# Patient Record
Sex: MALE | Race: WHITE
[De-identification: names, ages, dates, MRNs, and addresses within clinical notes are randomized per-mention and may not be internally consistent; named-entity substitution may affect disease eponyms.]

---

## 2019-05-28 ENCOUNTER — HOSPITAL ENCOUNTER (INPATIENT)
Dept: HOSPITAL 92 - ERS | Age: 64
LOS: 10 days | Discharge: NURSING, REHAB OR LONG-TERM CARE | DRG: 234 | End: 2019-06-07
Attending: HOSPITALIST | Admitting: HOSPITALIST
Payer: MEDICARE

## 2019-05-28 VITALS — BODY MASS INDEX: 30.2 KG/M2

## 2019-05-28 DIAGNOSIS — I25.110: Primary | ICD-10-CM

## 2019-05-28 DIAGNOSIS — E78.00: ICD-10-CM

## 2019-05-28 DIAGNOSIS — Z79.899: ICD-10-CM

## 2019-05-28 DIAGNOSIS — Z88.5: ICD-10-CM

## 2019-05-28 DIAGNOSIS — Z88.8: ICD-10-CM

## 2019-05-28 DIAGNOSIS — Z79.84: ICD-10-CM

## 2019-05-28 DIAGNOSIS — I10: ICD-10-CM

## 2019-05-28 DIAGNOSIS — E11.9: ICD-10-CM

## 2019-05-28 DIAGNOSIS — Z90.89: ICD-10-CM

## 2019-05-28 DIAGNOSIS — Z95.5: ICD-10-CM

## 2019-05-28 LAB
ALBUMIN SERPL BCG-MCNC: 4.8 G/DL (ref 3.4–4.8)
ALP SERPL-CCNC: 83 U/L (ref 40–150)
ALT SERPL W P-5'-P-CCNC: 31 U/L (ref 8–55)
ANION GAP SERPL CALC-SCNC: 14 MMOL/L (ref 10–20)
AST SERPL-CCNC: 23 U/L (ref 5–34)
BASOPHILS # BLD AUTO: 0 THOU/UL (ref 0–0.2)
BASOPHILS NFR BLD AUTO: 0.7 % (ref 0–1)
BILIRUB SERPL-MCNC: 0.6 MG/DL (ref 0.2–1.2)
BUN SERPL-MCNC: 18 MG/DL (ref 8.4–25.7)
CALCIUM SERPL-MCNC: 10.1 MG/DL (ref 7.8–10.44)
CHLORIDE SERPL-SCNC: 106 MMOL/L (ref 98–107)
CK SERPL-CCNC: 163 U/L (ref 30–200)
CO2 SERPL-SCNC: 24 MMOL/L (ref 23–31)
CREAT CL PREDICTED SERPL C-G-VRATE: 0 ML/MIN (ref 70–130)
EOSINOPHIL # BLD AUTO: 0.1 THOU/UL (ref 0–0.7)
EOSINOPHIL NFR BLD AUTO: 2.2 % (ref 0–10)
GLOBULIN SER CALC-MCNC: 2.5 G/DL (ref 2.4–3.5)
GLUCOSE SERPL-MCNC: 151 MG/DL (ref 80–115)
HGB BLD-MCNC: 15.1 G/DL (ref 14–18)
LIPASE SERPL-CCNC: 25 U/L (ref 8–78)
LYMPHOCYTES # BLD: 3 THOU/UL (ref 1.2–3.4)
LYMPHOCYTES NFR BLD AUTO: 44.6 % (ref 21–51)
MCH RBC QN AUTO: 32.3 PG (ref 27–31)
MCV RBC AUTO: 92 FL (ref 78–98)
MONOCYTES # BLD AUTO: 0.5 THOU/UL (ref 0.11–0.59)
MONOCYTES NFR BLD AUTO: 7.1 % (ref 0–10)
NEUTROPHILS # BLD AUTO: 3.1 THOU/UL (ref 1.4–6.5)
NEUTROPHILS NFR BLD AUTO: 45.4 % (ref 42–75)
PLATELET # BLD AUTO: 184 THOU/UL (ref 130–400)
POTASSIUM SERPL-SCNC: 4 MMOL/L (ref 3.5–5.1)
RBC # BLD AUTO: 4.68 MILL/UL (ref 4.7–6.1)
SODIUM SERPL-SCNC: 140 MMOL/L (ref 136–145)
TROPONIN I SERPL DL<=0.01 NG/ML-MCNC: (no result) NG/ML (ref ?–0.03)
WBC # BLD AUTO: 6.8 THOU/UL (ref 4.8–10.8)

## 2019-05-28 PROCEDURE — G0009 ADMIN PNEUMOCOCCAL VACCINE: HCPCS

## 2019-05-28 PROCEDURE — 85025 COMPLETE CBC W/AUTO DIFF WBC: CPT

## 2019-05-28 PROCEDURE — 36415 COLL VENOUS BLD VENIPUNCTURE: CPT

## 2019-05-28 PROCEDURE — S0017 INJECTION, AMINOCAPROIC ACID: HCPCS

## 2019-05-28 PROCEDURE — 80061 LIPID PANEL: CPT

## 2019-05-28 PROCEDURE — 85610 PROTHROMBIN TIME: CPT

## 2019-05-28 PROCEDURE — 85730 THROMBOPLASTIN TIME PARTIAL: CPT

## 2019-05-28 PROCEDURE — 83690 ASSAY OF LIPASE: CPT

## 2019-05-28 PROCEDURE — 93306 TTE W/DOPPLER COMPLETE: CPT

## 2019-05-28 PROCEDURE — 83036 HEMOGLOBIN GLYCOSYLATED A1C: CPT

## 2019-05-28 PROCEDURE — 93005 ELECTROCARDIOGRAM TRACING: CPT

## 2019-05-28 PROCEDURE — 86900 BLOOD TYPING SEROLOGIC ABO: CPT

## 2019-05-28 PROCEDURE — C1769 GUIDE WIRE: HCPCS

## 2019-05-28 PROCEDURE — 36416 COLLJ CAPILLARY BLOOD SPEC: CPT

## 2019-05-28 PROCEDURE — 99152 MOD SED SAME PHYS/QHP 5/>YRS: CPT

## 2019-05-28 PROCEDURE — 93798 PHYS/QHP OP CAR RHAB W/ECG: CPT

## 2019-05-28 PROCEDURE — 84484 ASSAY OF TROPONIN QUANT: CPT

## 2019-05-28 PROCEDURE — 71046 X-RAY EXAM CHEST 2 VIEWS: CPT

## 2019-05-28 PROCEDURE — 82805 BLOOD GASES W/O2 SATURATION: CPT

## 2019-05-28 PROCEDURE — 93458 L HRT ARTERY/VENTRICLE ANGIO: CPT

## 2019-05-28 PROCEDURE — 93010 ELECTROCARDIOGRAM REPORT: CPT

## 2019-05-28 PROCEDURE — 80053 COMPREHEN METABOLIC PANEL: CPT

## 2019-05-28 PROCEDURE — 93017 CV STRESS TEST TRACING ONLY: CPT

## 2019-05-28 PROCEDURE — 90670 PCV13 VACCINE IM: CPT

## 2019-05-28 PROCEDURE — 86850 RBC ANTIBODY SCREEN: CPT

## 2019-05-28 PROCEDURE — 78452 HT MUSCLE IMAGE SPECT MULT: CPT

## 2019-05-28 PROCEDURE — P9045 ALBUMIN (HUMAN), 5%, 250 ML: HCPCS

## 2019-05-28 PROCEDURE — 36430 TRANSFUSION BLD/BLD COMPNT: CPT

## 2019-05-28 PROCEDURE — 93880 EXTRACRANIAL BILAT STUDY: CPT

## 2019-05-28 PROCEDURE — 83880 ASSAY OF NATRIURETIC PEPTIDE: CPT

## 2019-05-28 PROCEDURE — 94002 VENT MGMT INPAT INIT DAY: CPT

## 2019-05-28 PROCEDURE — 86901 BLOOD TYPING SEROLOGIC RH(D): CPT

## 2019-05-28 PROCEDURE — 82550 ASSAY OF CK (CPK): CPT

## 2019-05-28 PROCEDURE — 85379 FIBRIN DEGRADATION QUANT: CPT

## 2019-05-28 PROCEDURE — A9500 TC99M SESTAMIBI: HCPCS

## 2019-05-28 PROCEDURE — 84443 ASSAY THYROID STIM HORMONE: CPT

## 2019-05-28 PROCEDURE — 90471 IMMUNIZATION ADMIN: CPT

## 2019-05-28 PROCEDURE — 83735 ASSAY OF MAGNESIUM: CPT

## 2019-05-28 PROCEDURE — S0028 INJECTION, FAMOTIDINE, 20 MG: HCPCS

## 2019-05-28 PROCEDURE — 80048 BASIC METABOLIC PNL TOTAL CA: CPT

## 2019-05-28 PROCEDURE — 94760 N-INVAS EAR/PLS OXIMETRY 1: CPT

## 2019-05-28 PROCEDURE — 71045 X-RAY EXAM CHEST 1 VIEW: CPT

## 2019-05-29 LAB
ANION GAP SERPL CALC-SCNC: 12 MMOL/L (ref 10–20)
BASOPHILS # BLD AUTO: 0 THOU/UL (ref 0–0.2)
BASOPHILS NFR BLD AUTO: 0.5 % (ref 0–1)
BUN SERPL-MCNC: 20 MG/DL (ref 8.4–25.7)
CALCIUM SERPL-MCNC: 9.8 MG/DL (ref 7.8–10.44)
CHD RISK SERPL-RTO: 5.4 (ref ?–4.5)
CHLORIDE SERPL-SCNC: 106 MMOL/L (ref 98–107)
CHOLEST SERPL-MCNC: 161 MG/DL
CO2 SERPL-SCNC: 26 MMOL/L (ref 23–31)
CREAT CL PREDICTED SERPL C-G-VRATE: 103 ML/MIN (ref 70–130)
EOSINOPHIL # BLD AUTO: 0.2 THOU/UL (ref 0–0.7)
EOSINOPHIL NFR BLD AUTO: 3.4 % (ref 0–10)
GLUCOSE SERPL-MCNC: 209 MG/DL (ref 80–115)
HDLC SERPL-MCNC: 30 MG/DL
HGB BLD-MCNC: 13.7 G/DL (ref 14–18)
LDLC SERPL CALC-MCNC: 85 MG/DL
LYMPHOCYTES # BLD: 2.4 THOU/UL (ref 1.2–3.4)
LYMPHOCYTES NFR BLD AUTO: 39.9 % (ref 21–51)
MAGNESIUM SERPL-MCNC: 2.2 MG/DL (ref 1.6–2.6)
MCH RBC QN AUTO: 33.2 PG (ref 27–31)
MCV RBC AUTO: 91.8 FL (ref 78–98)
MONOCYTES # BLD AUTO: 0.4 THOU/UL (ref 0.11–0.59)
MONOCYTES NFR BLD AUTO: 7.3 % (ref 0–10)
NEUTROPHILS # BLD AUTO: 2.9 THOU/UL (ref 1.4–6.5)
NEUTROPHILS NFR BLD AUTO: 48.9 % (ref 42–75)
PLATELET # BLD AUTO: 146 THOU/UL (ref 130–400)
POTASSIUM SERPL-SCNC: 3.6 MMOL/L (ref 3.5–5.1)
RBC # BLD AUTO: 4.13 MILL/UL (ref 4.7–6.1)
SODIUM SERPL-SCNC: 140 MMOL/L (ref 136–145)
TRIGL SERPL-MCNC: 231 MG/DL (ref ?–150)
TROPONIN I SERPL DL<=0.01 NG/ML-MCNC: (no result) NG/ML (ref ?–0.03)
WBC # BLD AUTO: 6 THOU/UL (ref 4.8–10.8)

## 2019-05-29 RX ADMIN — FAMOTIDINE SCH MG: 10 INJECTION, SOLUTION INTRAVENOUS at 22:03

## 2019-05-29 RX ADMIN — FAMOTIDINE SCH MG: 10 INJECTION, SOLUTION INTRAVENOUS at 12:15

## 2019-05-29 NOTE — NM
EXAM: CARDIAC SPECT



HISTORY: Chest pain, coronary artery disease, stent, hypertension, diabetes, dyslipidemia



TECHNIQUE: A myocardial perfusion scan was performed using the single isotope 1 day protocol with michelle
hnetium 99m sestamibi. [10 mCi] was injected intravenously for the rest exam followed by 33 mCifor

the stress study. 



Pharmacologic stress with adenosine was monitored and interpreted by Dr. Matson



FINDINGS: There is a defect in the apex on the stress images with near complete reversibility at rest
.



Gated SPECT LVEF: 44%



Wall motion exam: Global hypokinesis



IMPRESSION: Findings suggestive of reversible ischemia in the apex.



Reported By: Brent Howard 

Electronically Signed:  5/29/2019 1:01 PM

## 2019-05-29 NOTE — PDOC.PN
- Subjective


Encounter Start Date: 05/29/19


Encounter Start Time: 17:19





Patient lying in bed, he reports feeling better. He denies any chest pain, 

palpitations or shortness of breath. He underwent a stress test which showed 

reversibility therefore Cardiology Dr Matson was consulted.





- Objective


Resuscitation Status - Order Detail:





05/29/19 01:40


Resuscitation Status Routine 


   Co-Sign Provider: 


   Resuscitation Status: FULL: Full Resuscitation








MAR Reviewed: Yes


Vital Signs & Weight: 


 Vital Signs (12 hours)











  Temp Pulse Resp BP Pulse Ox


 


 05/29/19 15:33  97.7 F  75  16  171/79 H  97


 


 05/29/19 12:00  97.5 F L  75  20  177/84 H  97


 


 05/29/19 08:00  97.1 F L  69  18  174/87 H  97








 Weight











Admit Weight                   206 lb 12.8 oz


 


Weight                         206 lb 12.8 oz














I&O: 


 











 05/28/19 05/29/19 05/30/19





 06:59 06:59 06:59


 


Intake Total  840 


 


Balance  840 











Result Diagrams: 


 05/29/19 02:02





 05/29/19 02:02


Additional Labs: 


 Accuchecks











  05/29/19 05/29/19





  16:41 12:23


 


POC Glucose  162 H  291 H











Radiology Reviewed by me: Yes





Phys Exam





- Physical Examination


Constitutional: NAD


HEENT: moist MMs, oral pharynx no lesions


Neck: no nodes, supple


Respiratory: no wheezing, clear to auscultation bilateral


Cardiovascular: RRR, no significant murmur


Gastrointestinal: soft, no distention, positive bowel sounds


Musculoskeletal: no edema, pulses present


Neurological: non-focal, normal sensation, moves all 4 limbs


Lymphatic: no nodes


Psychiatric: normal affect, A&O x 3


Skin: normal turgor, cap refill <2 seconds





Dx/Plan


(1) CAD (coronary artery disease)


Code(s): I25.10 - ATHSCL HEART DISEASE OF NATIVE CORONARY ARTERY W/O ANG PCTRS 

  Status: Acute   





(2) HTN (hypertension)


Code(s): I10 - ESSENTIAL (PRIMARY) HYPERTENSION   Status: Acute   





(3) DM type 2 (diabetes mellitus, type 2)


Status: Acute   





- Plan


cont current plan of care





* Continue home medications


* Due to patient having abnormal stress, Dr Matson was consulted and is 

planning heart cath tomorrow


* Monitor vitals and labs in the morning


* Pending heart cath, may be able to be discharged home tomorrow afternoon

## 2019-05-29 NOTE — CON
DATE OF CONSULTATION:  05/29/2019



REASON FOR CONSULTATION:  Abnormal stress test.



HISTORY OF PRESENT ILLNESS:  Mr. Thibodeaux is a pleasant 64-year-old white gentleman,

who comes to the hospital for chest discomfort and palpitations.  He lives in

[place] and went to the ER in [place] and was having palpitations, which accompanied

themselves with chest pain.  He was found to be in [facility].  He was transferred

over here for further evaluation and care.  He has a history of coronary artery

disease.  He had an MI about 10 years ago.  He underwent heart catheterization and

had stents to his LAD and he was told he had residual RCA disease that was treated

medically and has been treated medically for a long time.  Last time he saw Dr. Zamarripa was about 3 or 4 years ago and has not seen him since.  He has had no

problems until now. 



PAST MEDICAL HISTORY:  

1. Coronary artery disease, status post stenting as above.

2. Hyperlipidemia.

3. Hypertension.

4. Type 2 diabetes.



PAST SURGICAL HISTORY:  

1. Stenting x4 as above.

2. Tonsillectomy.



SOCIAL HISTORY:  Social alcohol use.  No drug use.  No tobacco.



REVIEW OF SYSTEMS:  A 12-point review of systems was done and was all negative

unless stated in the history of present illness. 



OUTPATIENT MEDICATIONS:  Include;

1. Flonase.

2. Cetirizine.

3. Lisinopril 10 mg a day.

4. Metformin 1000 mg b.i.d.

5. Glyburide 10 mg b.i.d.

6. Atorvastatin 20 mg at bedtime.



ALLERGIES:  

1. ACETAMINOPHEN.

2. TRULICITY.

3. VICODIN.



FAMILY HISTORY:  Noncontributory.



PHYSICAL EXAMINATION:

VITAL SIGNS:  Temperature 97.7, pulse 75, respiratory rate 16, sat 97% on room air,

and blood pressure 131/79. 

GENERAL:  Awake, alert, and oriented x3, in no distress. 

HEENT:  Normocephalic and atraumatic. 

NECK:  Supple. 

LUNGS:  Clear. 

CARDIOVASCULAR:  S1 and S2.  No S3 or S4.  There is a grade 2/6 systolic murmur at

the right lower sternal border, 4th intercostal space. 

ABDOMEN:  Soft.  Positive bowel sounds. 

EXTREMITIES:  No edema. 

SKIN:  Warm and dry.



LABORATORY DATA:  Laboratory work was reviewed.  CBC with a white count of 6,

hemoglobin of 15, hematocrit of 43, and platelet count of 184.  Coags, D-dimer was

undetectable.  Chemistry was unremarkable except for glucose of 290s.  Troponin was

undetectable x3 and triglycerides of 231, cholesterol of 161, LDL of 85, HDL of 30.

TSH was normal 2.6.  Stress test was reviewed.  He has a reduced EF at 44% and

apical ischemia. 



ASSESSMENT:  

1. Unstable angina.

2. Abnormal stress test.

3. Reduced left ventricular function.

4. History of coronary artery disease, status post stenting in the distant past.



PLAN:  

1. We will plan on further risk stratifying with a heart catheterization.  We spoke

at length with risks and benefits of the procedure.  Risks included, but not limited

to stroke, MI, death, bleeding, need for blood transfusion, limb loss, organ loss,

contrast reactions.  The patient understands, verbalized understanding and agrees to

proceed. 

2. We will plan on doing this tomorrow morning.  We will keep n.p.o. post midnight.

3. Further recommendations per results of coronary angiogram.







Job ID:  [id number]

## 2019-05-29 NOTE — HP
CHIEF COMPLAINT:  Shortness of breath and palpitations.



HISTORY OF PRESENT ILLNESS:  Mr. Thibodeaux is a very pleasant 64-year-old man, who

presents with complaints of palpitations and increasing shortness of breath, which

has been going on for the last month approximately.  The patient states his symptoms

have progressively worsened, prompting him to go to the ER in [place] and on ECG

done, he was found to be in bigeminy.  The patient denies having any associated

chest pain, but he has been feeling lightheaded.  His symptoms occur with and

without exertion.  He states he has a history of coronary artery disease and had an

MI several years ago, after which he underwent four cardiac stents.  He states he

does not feel the same as he did with the MI.  He denies experiencing any chest

pain, numbness, or tingling in either extremity nor pain.  He reports having issues

with his allergies over the last several weeks.  Complains of an occasional cough

and denies any hemoptysis.  He does state he sometimes bleeds from his sinuses.  He

feels his allergies have been much worse than usual recently.  The patient does

report having noted some epigastric discomfort that seems to be associated with

these episodes of palpitations and shortness of breath.  He states these episodes

are occurring more frequently, but do not last more than an hour at a time and more

spread out throughout the day.  Brought on by exertion, and while at rest. 



REVIEW OF SYSTEMS:  The patient denies having any headaches or dizziness.  No

fevers, chills, or sweats.  He denies any chest pain.  Reports palpitations and

reports feeling as if his heart was pounding.  Has associated shortness of breath

and lightheadedness.  No syncopal episodes.  Denies having any abdominal pain or

cramping.  No bowel changes.  No urinary symptoms.  No fevers, chills, or sweats.

All other review of systems are negative. 



PAST MEDICAL HISTORY:  

1. Coronary artery disease, stent.

2. Hypercholesterolemia.

3. Hypertension.

4. Diabetes mellitus, type 2.



PAST SURGICAL HISTORY:  

1. Cardiac stents x4.

2. Tonsillectomy.



SOCIAL HISTORY:  The patient reports drinking alcohol socially.  No illicit drug

use.  No smoking history. 



PHYSICAL EXAMINATION:

GENERAL:  The patient appears well developed, well nourished, and is in no acute

distress.  He is found resting comfortably in bed. 

HEENT:  Normocephalic and atraumatic.  Pupils are equal, round, and reactive to

light.  Sclerae are without icterus.  Oropharynx is clear. 

NECK:  Supple without lymphadenopathy. 

LUNGS:  Clear to auscultation bilaterally without any wheezes, rales, or rhonchi. 

CARDIAC:  Regular rate and rhythm.  No chest wall tenderness. 

ABDOMEN:  Soft, nontender, nondistended.  Normoactive bowel sounds present.  No

guarding or rigidity. 

EXTREMITIES:  Without lower leg swelling or edema. 

NEUROLOGIC:  Alert and oriented x3. 

SKIN:  Without rash or jaundice.



LABORATORY DATA:  White blood count 6.8, hemoglobin 15.1, hematocrit 43, and

platelets 184.  Sodium 140, potassium 4.0, chloride 106, BUN 18, creatinine 1.04,

GFR 72, glucose 151, and calcium 10.1.  LFTs unremarkable.  Troponin negative x2.

Albumin 4.8 and lipase 25. 



IMAGING DATA:  None.



IMPRESSION AND PLAN:  Mr. Thibodeaux is a pleasant 64-year-old man, who is being

admitted for management of the following; 

1. Palpitations with shortness of breath.  The patient states these episodes have

been progressively worsening over the last month.  He has not sought medical

attention for this and states he has had occasional episodes with associated

epigastric discomfort.  The patient underwent an EKG prior to transfer that showed

signs of bigeminy.  At present, we will continue to monitor.  Awaiting third

troponin.  BNP was checked and showed a level of 246.8.  No signs of fluid overload

on exam.  We will obtain a chest x-ray.  We will also request an echo.  Day Team to

decide further investigations.  Of note, I have added a magnesium and TSH as well as

fasting lipid panel. 

2. Hypertension.  Resume home medication and monitor blood pressure.

3. Diabetes.  Hold metformin and glipizide.  We will initiate insulin sliding scale

and monitor blood glucose. 

4. Gastrointestinal prophylaxis.

5. Deep venous thrombosis prophylaxis.

6. Full code status.  His surrogate decision maker is his wife, Rae Thibodeaux. 

The patient's case was discussed with Dr. Cordoba, who agrees with the plan of care

as described above. 







Job ID:  [id number]

## 2019-05-29 NOTE — RAD
RADIOGRAPH CHEST 2 VIEWS:



DATE:

5/29/2019



HISTORY:

64-year-old male with cough



FINDINGS:

There is no airspace density, pulmonary edema, pleural effusion, pneumothorax, or cardiomegaly.



IMPRESSION:

No acute cardiopulmonary findings.



Reported By: Rome Su 

Electronically Signed:  5/29/2019 8:34 AM

## 2019-05-30 LAB
ANION GAP SERPL CALC-SCNC: 12 MMOL/L (ref 10–20)
BASOPHILS # BLD AUTO: 0 THOU/UL (ref 0–0.2)
BASOPHILS NFR BLD AUTO: 0.7 % (ref 0–1)
BUN SERPL-MCNC: 17 MG/DL (ref 8.4–25.7)
CALCIUM SERPL-MCNC: 9.4 MG/DL (ref 7.8–10.44)
CHLORIDE SERPL-SCNC: 104 MMOL/L (ref 98–107)
CO2 SERPL-SCNC: 26 MMOL/L (ref 23–31)
CREAT CL PREDICTED SERPL C-G-VRATE: 102 ML/MIN (ref 70–130)
EOSINOPHIL # BLD AUTO: 0.2 THOU/UL (ref 0–0.7)
EOSINOPHIL NFR BLD AUTO: 3.3 % (ref 0–10)
GLUCOSE SERPL-MCNC: 231 MG/DL (ref 80–115)
HGB BLD-MCNC: 15.1 G/DL (ref 14–18)
LYMPHOCYTES # BLD: 1.6 THOU/UL (ref 1.2–3.4)
LYMPHOCYTES NFR BLD AUTO: 26.1 % (ref 21–51)
MCH RBC QN AUTO: 32 PG (ref 27–31)
MCV RBC AUTO: 90.3 FL (ref 78–98)
MONOCYTES # BLD AUTO: 0.3 THOU/UL (ref 0.11–0.59)
MONOCYTES NFR BLD AUTO: 5.1 % (ref 0–10)
NEUTROPHILS # BLD AUTO: 3.9 THOU/UL (ref 1.4–6.5)
NEUTROPHILS NFR BLD AUTO: 64.9 % (ref 42–75)
PLATELET # BLD AUTO: 162 THOU/UL (ref 130–400)
POTASSIUM SERPL-SCNC: 3.9 MMOL/L (ref 3.5–5.1)
RBC # BLD AUTO: 4.71 MILL/UL (ref 4.7–6.1)
SODIUM SERPL-SCNC: 138 MMOL/L (ref 136–145)
WBC # BLD AUTO: 6 THOU/UL (ref 4.8–10.8)

## 2019-05-30 PROCEDURE — 4A023N7 MEASUREMENT OF CARDIAC SAMPLING AND PRESSURE, LEFT HEART, PERCUTANEOUS APPROACH: ICD-10-PCS | Performed by: INTERNAL MEDICINE

## 2019-05-30 PROCEDURE — B2111ZZ FLUOROSCOPY OF MULTIPLE CORONARY ARTERIES USING LOW OSMOLAR CONTRAST: ICD-10-PCS | Performed by: INTERNAL MEDICINE

## 2019-05-30 PROCEDURE — B2151ZZ FLUOROSCOPY OF LEFT HEART USING LOW OSMOLAR CONTRAST: ICD-10-PCS | Performed by: INTERNAL MEDICINE

## 2019-05-30 RX ADMIN — INSULIN LISPRO PRN UNIT: 100 INJECTION, SOLUTION INTRAVENOUS; SUBCUTANEOUS at 17:55

## 2019-05-30 RX ADMIN — FAMOTIDINE SCH MG: 10 INJECTION, SOLUTION INTRAVENOUS at 09:04

## 2019-05-30 RX ADMIN — FAMOTIDINE SCH MG: 10 INJECTION, SOLUTION INTRAVENOUS at 20:29

## 2019-05-30 NOTE — STRESS
Acquisition Time: 2019-05-29  11:00:46

Total Exercise Time: 00:04:00

Test Indications: CHEST PAIN

Medications: 

 

 

 

 

 

 

 

 

 

Protocol: ADENOSINE

Max HR: 086 BPM  55% of  Pred: 155 BPM

Max BP: 142/080 mmHG

Max Work Load: 1.0 METS

 

RESTING ECG: NORMAL SINUS RHYTHM AT 83 BPM WITH NON-SPECIFIC ST SEGMENT CHANGES

 AND OCCASIONAL PVC'S

SYMPTOMS: NONE

NORMAL BP RESPONSE

ECTOPY: OCCASIONAL PVC'S

ECG STRESS: NO SIGNIFICANT CHANGES

INTERPRETATION:  INDETERMINATE ECG/AWAIT NUCLEAR IMAGES FOR DEFINITIVE DIAGNOSIS

 

Confirmed by VANDANA LANDRY M.D. ([id number]) on 5/30/2019 5:13:28 PM

 

Referred By: NAY FREEMAN           Confirmed By:VANDANA LANDRY M.D.

## 2019-05-30 NOTE — PDOC.PN
- Subjective


Encounter Start Date: 05/30/19


Encounter Start Time: 15:42





Patient lying in bed on bedrest s/p heart cath, he denies chest pain, 

palpitations or shortness of breath. Multivessel disease was noted, CABG is 

recommended at this time.





- Objective


Resuscitation Status - Order Detail:





05/29/19 01:40


Resuscitation Status Routine 


   Co-Sign Provider: 


   Resuscitation Status: FULL: Full Resuscitation








MAR Reviewed: Yes


Vital Signs & Weight: 


 Vital Signs (12 hours)











  Temp Pulse Resp BP BP Pulse Ox


 


 05/30/19 15:26  98.1 F  78  16   145/68 H  97


 


 05/30/19 11:33  97.5 F L  86  15   178/79 H  94 L


 


 05/30/19 05:56     160/86 H  


 


 05/30/19 04:05   64  18  160/86 H   96








 Weight











Admit Weight                   206 lb 12.8 oz


 


Weight                         206 lb 12.8 oz














I&O: 


 











 05/29/19 05/30/19 05/31/19





 06:59 06:59 06:59


 


Intake Total 840 800 


 


Output Total  400 


 


Balance 840 400 











Result Diagrams: 


 05/30/19 08:49





 05/30/19 08:49


Additional Labs: 


 Accuchecks











  05/30/19 05/30/19 05/29/19





  10:35 05:07 21:38


 


POC Glucose  219 H  182 H  196 H














  05/29/19





  16:41


 


POC Glucose  162 H











Radiology Reviewed by me: Yes





Phys Exam





- Physical Examination


Constitutional: NAD


HEENT: moist MMs, oral pharynx no lesions


Neck: supple


Respiratory: no wheezing, clear to auscultation bilateral


Cardiovascular: RRR, no significant murmur


Gastrointestinal: soft, no distention, positive bowel sounds


Musculoskeletal: no edema, pulses present


No tenderness at access point right side, no hematoma appreciated


Neurological: normal sensation, moves all 4 limbs


Lymphatic: no nodes


Psychiatric: normal affect, A&O x 3


Skin: no rash, cap refill <2 seconds





Dx/Plan


(1) CAD (coronary artery disease)


Code(s): I25.10 - ATHSCL HEART DISEASE OF NATIVE CORONARY ARTERY W/O ANG PCTRS 

  Status: Acute   





(2) HTN (hypertension)


Code(s): I10 - ESSENTIAL (PRIMARY) HYPERTENSION   Status: Acute   





(3) DM type 2 (diabetes mellitus, type 2)


Status: Acute   





- Plan


cont current plan of care, plan discussed w/ family





* Patient with multivessel disease, CT surgery will be consulted for CABG


* Cardiology services following


* Continue medical management for now


* Continue bedrest as ordered s/p heart cath


* Family at bedside, questions answered


* He will be transitioned to inpatient status as he will require further 

evaluation for CABG

## 2019-05-31 RX ADMIN — INSULIN LISPRO PRN UNIT: 100 INJECTION, SOLUTION INTRAVENOUS; SUBCUTANEOUS at 08:19

## 2019-05-31 RX ADMIN — FAMOTIDINE SCH MG: 10 INJECTION, SOLUTION INTRAVENOUS at 08:18

## 2019-05-31 RX ADMIN — Medication PRN ML: at 08:19

## 2019-05-31 RX ADMIN — INSULIN LISPRO PRN UNIT: 100 INJECTION, SOLUTION INTRAVENOUS; SUBCUTANEOUS at 11:51

## 2019-05-31 NOTE — ULT
EXAM: Carotid ultrasound



HISTORY: Preoperative exam prior to CABG.



COMPARISON: None



TECHNIQUE: Multiplanar grayscale and color Doppler images were obtained in a carotid ultrasound. Spec
tral analysis of the Doppler waveforms were performed.



FINDINGS:

No significant plaque is visualized in either internal carotid artery. No significant plaque is seen 
in either common carotid artery.



The Doppler waveforms are normal in the visualized vessels.



Peak systolic velocity in the right internal carotid artery 96 cm/s.

Peak systolic velocity in the right common carotid artery 81 cm/s.

The right ICA/CCA ratio is 1.2.



Peak systolic velocity in the left internal carotid artery 68 cm/s.

Peak systolic velocity in the left common carotid artery 105 cm/s.

The left ICA/CCA ratio is 0.7.



Both vertebral arteries demonstrate antegrade flow without focal stenosis



IMPRESSION:

No evidence of hemodynamically significant stenosis.



Reported By: Satinder Martin 

Electronically Signed:  5/31/2019 6:50 PM

## 2019-05-31 NOTE — CON
DATE OF CONSULTATION:  



HISTORY OF PRESENT ILLNESS:  Richy Thibodeaux is a 64-year-old gentleman, who

underwent cardiac stenting by Dr. Zamarripa a number of years ago.  The patient stated

he was followed up for several years with routine stress tests and no repeat

catheterizations.  However, the past few weeks, he has noticed some palpitations

associated with some dyspnea and perhaps some mild chest discomfort prompting a

visit to the ER in [place] and then transfer here where a stress test showed some

Cardiolite abnormalities in the apex.  Cardiac catheterization showed severe LAD

disease as well as severe disease involving both of his stents.  __________ left

dominant system with disease in a small posterior descending artery.  The obtuse

marginal stents in both the obtuse marginal vessels had in-stent stenosis, but

unfortunately, they were critical lesions quite distally in these vessels as well.

He had a small diagonal proximally that also had significant disease.  His risk

factors include dyslipidemia for which he is on a statin, hypertension, diabetes

mellitus type 2 with A1cs generally less than 7. 



PAST SURGICAL HISTORY:  Includes tonsillectomy.



SOCIAL HISTORY:  He is .  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

He has never smoked. 



PHYSICAL EXAMINATION:

GENERAL:  He is an alert, cooperative gentleman, looking younger than his stated age

with a height of 5 feet 9 inches, weight of 205 pounds. 

HEENT:  Carotids are without bruits. 

CARDIAC:  Heart rate of about 70.  No murmurs. 

LUNGS:  Clear to auscultation. 

ABDOMEN:  Soft and nontender. 

EXTREMITIES:  He has palpable femoral and popliteal pulses bilaterally as well as

left dorsalis pedis pulse with no peripheral edema. 



ALLERGIES:  TRULICITY AND VICODIN.



LABORATORY VALUES:  Hemoglobin is 15.  Creatinine of about 1.  Total cholesterol of

161 and triglyceride of 231. 



HOME MEDICATIONS:  Include;

1. Aspirin.

2. Glyburide 10 b.i.d.

3. Metformin 1000 b.i.d.

4. Lisinopril 10 daily.

5. Flonase.

6. Cetirizine p.r.n. 

Discussed the case thoroughly with Dr. Matson, as well as the patient.  He will have

some non bypassable targets, but could graft the LAD distally, perhaps the

__________ OM2.  I doubt that the left PDA is bypassable and the right system is

small and nondominant.  Informed consent has been obtained for coronary bypass

grafting Monday. 







Job ID:  [id number]

## 2019-05-31 NOTE — PDOC.CTH
Cardiology Progress Note





- Subjective





No chest pain. No new issues.





- Objective


 Vital Signs











  Temp Pulse Resp BP BP BP Pulse Ox


 


 05/31/19 15:08  98.0 F  61  18    145/74 H  98


 


 05/31/19 11:31  97.9 F  64  17   153/74 H   95


 


 05/31/19 08:19     172/82 H   


 


 05/31/19 08:10  97.8 F  64  17   172/82 H   98








 











Admit Weight                   206 lb 12.8 oz


 


Weight                         205 lb 6 oz














 











 05/30/19 05/31/19 06/01/19





 06:59 06:59 06:59


 


Intake Total 800 480 


 


Output Total 400  


 


Balance 400 480 














- Physical Examination


General/Neuro: alert & oriented x3, NAD


Neck: no JVD present


Lungs: CTA, unlabored respirations


Heart: RRR


Abdomen: NT/ND


Extremities: other: (no edema)





- Telemetry


Telemetry Rhythm: NSR





- Labs


Result Diagrams: 


 05/30/19 08:49





 05/30/19 08:49


 Troponin/CKMB











Troponin I  Less than  0.010 ng/mL (< 0.028)   05/29/19  02:02    














- Assessment/Plan





1. Severer multivessel CAD.


2. HTN


3. T2DM





PLAN:


- CT surgery consultation pending. 


- ASA, statin, BB , ACEI.

## 2019-05-31 NOTE — PDOC.PN
- Subjective


Encounter Start Date: 05/31/19


Encounter Start Time: 10:45





Doing well.  No complaints.  Understands the situation.  





- Objective


Resuscitation Status - Order Detail:





05/29/19 01:40


Resuscitation Status Routine 


   Co-Sign Provider: 


   Resuscitation Status: FULL: Full Resuscitation








Vital Signs & Weight: 


 Vital Signs (12 hours)











  Temp Pulse Resp BP BP Pulse Ox


 


 05/31/19 11:31  97.9 F  64  17   153/74 H  95


 


 05/31/19 08:19     172/82 H  


 


 05/31/19 08:10  97.8 F  64  17   172/82 H  98


 


 05/31/19 04:00  98.1 F  72  19   151/78 H  95








 Weight











Admit Weight                   206 lb 12.8 oz


 


Weight                         205 lb 6 oz














I&O: 


 











 05/30/19 05/31/19 06/01/19





 06:59 06:59 06:59


 


Intake Total 800 480 


 


Output Total 400  


 


Balance 400 480 











Result Diagrams: 


 05/30/19 08:49





 05/30/19 08:49


Additional Labs: 


 Accuchecks











  05/31/19 05/31/19 05/30/19





  11:03 05:28 20:09


 


POC Glucose  317 H  244 H  188 H














  05/30/19





  16:50


 


POC Glucose  207 H














Phys Exam





- Physical Examination


Constitutional: NAD


Respiratory: no wheezing, no rales, no rhonchi, clear to auscultation bilateral


Cardiovascular: RRR, no significant murmur, no rub


Gastrointestinal: soft, non-tender, no distention, positive bowel sounds


Musculoskeletal: no edema


Psychiatric: normal affect, A&O x 3





Dx/Plan


(1) CAD (coronary artery disease)


Code(s): I25.10 - ATHSCL HEART DISEASE OF NATIVE CORONARY ARTERY W/O ANG PCTRS 

  Status: Acute   





(2) DM type 2 (diabetes mellitus, type 2)


Status: Acute   





(3) HTN (hypertension)


Code(s): I10 - ESSENTIAL (PRIMARY) HYPERTENSION   Status: Acute   





- Plan





* Doing well.


* Multivessel disease.


* Awaiting [facility] consult. 


* Will likely has intervention on Monday.


* Continue meds.

## 2019-06-01 RX ADMIN — ASPIRIN SCH MG: 81 TABLET ORAL at 10:00

## 2019-06-01 RX ADMIN — Medication SCH ML: at 20:17

## 2019-06-01 RX ADMIN — Medication PRN ML: at 08:13

## 2019-06-01 RX ADMIN — INSULIN LISPRO PRN UNIT: 100 INJECTION, SOLUTION INTRAVENOUS; SUBCUTANEOUS at 20:59

## 2019-06-01 RX ADMIN — INSULIN LISPRO PRN UNIT: 100 INJECTION, SOLUTION INTRAVENOUS; SUBCUTANEOUS at 12:25

## 2019-06-01 RX ADMIN — INSULIN LISPRO PRN UNIT: 100 INJECTION, SOLUTION INTRAVENOUS; SUBCUTANEOUS at 08:10

## 2019-06-01 NOTE — PDOC.PN
- Subjective


Encounter Start Date: 06/01/19


Encounter Start Time: 09:30





Doing well.  No complaints. 





- Objective


Resuscitation Status - Order Detail:





05/29/19 01:40


Resuscitation Status Routine 


   Co-Sign Provider: 


   Resuscitation Status: FULL: Full Resuscitation








Vital Signs & Weight: 


 Vital Signs (12 hours)











  Temp Pulse Resp BP BP BP Pulse Ox


 


 06/01/19 12:22  97.9 F  73  17    155/79 H  98


 


 06/01/19 08:11     168/86 H   


 


 06/01/19 08:07  97.9 F  64  17   168/86 H   96


 


 06/01/19 04:00  97.9 F  65  18   169/89 H   98








 Weight











Admit Weight                   206 lb 12.8 oz


 


Weight                         204 lb 8 oz














I&O: 


 











 05/31/19 06/01/19 06/02/19





 06:59 06:59 06:59


 


Intake Total 480 1830 


 


Output Total  1000 


 


Balance 480 830 











Result Diagrams: 


 05/30/19 08:49





 05/30/19 08:49


Additional Labs: 


 Accuchecks











  06/01/19 06/01/19 05/31/19





  10:40 05:28 20:40


 


POC Glucose  263 H  162 H  155 H














  05/31/19





  16:56


 


POC Glucose  138 H














Phys Exam





- Physical Examination


Constitutional: NAD


Respiratory: no wheezing, no rales, no rhonchi, clear to auscultation bilateral


Cardiovascular: RRR, no significant murmur, no rub


Gastrointestinal: soft, non-tender, no distention, positive bowel sounds





Dx/Plan


(1) CAD (coronary artery disease)


Code(s): I25.10 - ATHSCL HEART DISEASE OF NATIVE CORONARY ARTERY W/O ANG PCTRS 

  Status: Acute   





(2) DM type 2 (diabetes mellitus, type 2)


Status: Acute   





(3) HTN (hypertension)


Code(s): I10 - ESSENTIAL (PRIMARY) HYPERTENSION   Status: Acute   





- Plan





* Glucose adequately controlled.


* Awaiting CABG on Monday.

## 2019-06-02 RX ADMIN — Medication SCH ML: at 21:26

## 2019-06-02 RX ADMIN — INSULIN LISPRO PRN UNIT: 100 INJECTION, SOLUTION INTRAVENOUS; SUBCUTANEOUS at 17:26

## 2019-06-02 RX ADMIN — INSULIN LISPRO PRN UNIT: 100 INJECTION, SOLUTION INTRAVENOUS; SUBCUTANEOUS at 13:01

## 2019-06-02 RX ADMIN — ASPIRIN SCH MG: 81 TABLET ORAL at 09:34

## 2019-06-02 RX ADMIN — Medication SCH ML: at 09:39

## 2019-06-02 RX ADMIN — INSULIN LISPRO PRN UNIT: 100 INJECTION, SOLUTION INTRAVENOUS; SUBCUTANEOUS at 09:35

## 2019-06-02 RX ADMIN — INSULIN LISPRO PRN UNIT: 100 INJECTION, SOLUTION INTRAVENOUS; SUBCUTANEOUS at 21:26

## 2019-06-02 NOTE — PDOC.PN
- Subjective


Encounter Start Date: 06/02/19


Encounter Start Time: 08:00





Doing well.  No complaints.  Ready for surgery tomorrow.





- Objective


Resuscitation Status - Order Detail:





05/29/19 01:40


Resuscitation Status Routine 


   Co-Sign Provider: 


   Resuscitation Status: FULL: Full Resuscitation








Vital Signs & Weight: 


 Vital Signs (12 hours)











  Temp Pulse Resp BP BP Pulse Ox


 


 06/02/19 09:34     147/78 H  


 


 06/02/19 08:00  97.7 F  65  16   147/78 H  96


 


 06/02/19 05:38  97.0 F L  66  18   147/80 H  95








 Weight











Admit Weight                   206 lb 12.8 oz


 


Weight                         201 lb 1 oz














I&O: 


 











 06/01/19 06/02/19 06/03/19





 06:59 06:59 06:59


 


Intake Total 1830 1680 


 


Output Total 1000  


 


Balance 830 1680 











Result Diagrams: 


 05/30/19 08:49





 05/30/19 08:49


Additional Labs: 


 Accuchecks











  06/02/19 06/01/19 06/01/19





  05:43 20:30 17:02


 


POC Glucose  210 H  334 H  139 H














Phys Exam





- Physical Examination


Constitutional: NAD


Respiratory: no wheezing, no rales, no rhonchi, clear to auscultation bilateral


Cardiovascular: RRR, no significant murmur, no rub


Gastrointestinal: soft, non-tender, no distention, positive bowel sounds


Musculoskeletal: no edema





Dx/Plan


(1) CAD (coronary artery disease)


Code(s): I25.10 - ATHSCL HEART DISEASE OF NATIVE CORONARY ARTERY W/O ANG PCTRS 

  Status: Acute   





(2) DM type 2 (diabetes mellitus, type 2)


Status: Acute   





(3) HTN (hypertension)


Code(s): I10 - ESSENTIAL (PRIMARY) HYPERTENSION   Status: Acute   





- Plan





* Doing well.


* CABG tomorrow.


* Continue Statin, Asa.

## 2019-06-03 LAB
ANALYZER IN CARDIO: (no result)
ANALYZER IN CARDIO: (no result)
ANION GAP SERPL CALC-SCNC: 11 MMOL/L (ref 10–20)
APTT PPP: 28.2 SEC (ref 22.9–36.1)
BASE EXCESS STD BLDA CALC-SCNC: -2.4 MEQ/L
BASE EXCESS STD BLDA CALC-SCNC: -2.7 MEQ/L
BASOPHILS # BLD AUTO: 0 THOU/UL (ref 0–0.2)
BASOPHILS NFR BLD AUTO: 0.3 % (ref 0–1)
BUN SERPL-MCNC: 14 MG/DL (ref 8.4–25.7)
CA-I BLDA-SCNC: 1.13 MMOL/L (ref 1.12–1.3)
CA-I BLDA-SCNC: 1.14 MMOL/L (ref 1.12–1.3)
CALCIUM SERPL-MCNC: 8.6 MG/DL (ref 7.8–10.44)
CHLORIDE SERPL-SCNC: 106 MMOL/L (ref 98–107)
CO2 SERPL-SCNC: 23 MMOL/L (ref 23–31)
CREAT CL PREDICTED SERPL C-G-VRATE: 111 ML/MIN (ref 70–130)
EOSINOPHIL # BLD AUTO: 0.2 THOU/UL (ref 0–0.7)
EOSINOPHIL NFR BLD AUTO: 1.4 % (ref 0–10)
GLUCOSE SERPL-MCNC: 260 MG/DL (ref 80–115)
HCO3 BLDA-SCNC: 21.3 MEQ/L (ref 22–28)
HCO3 BLDA-SCNC: 21.9 MEQ/L (ref 22–28)
HCT VFR BLDA CALC: 37 % (ref 42–52)
HCT VFR BLDA CALC: 42 % (ref 42–52)
HGB BLD-MCNC: 12.6 G/DL (ref 14–18)
HGB BLD-MCNC: 13.9 G/DL (ref 14–18)
HGB BLDA-MCNC: 12.5 G/DL (ref 14–18)
HGB BLDA-MCNC: 14.2 G/DL (ref 14–18)
INR PPP: 1.3
LYMPHOCYTES # BLD: 1.7 THOU/UL (ref 1.2–3.4)
LYMPHOCYTES NFR BLD AUTO: 15.4 % (ref 21–51)
MCH RBC QN AUTO: 32.4 PG (ref 27–31)
MCV RBC AUTO: 90.9 FL (ref 78–98)
MONOCYTES # BLD AUTO: 0.7 THOU/UL (ref 0.11–0.59)
MONOCYTES NFR BLD AUTO: 6.5 % (ref 0–10)
NEUTROPHILS # BLD AUTO: 8.4 THOU/UL (ref 1.4–6.5)
NEUTROPHILS NFR BLD AUTO: 76.5 % (ref 42–75)
O2 A-A PPRESDIFF RESPIRATORY: 205.47 MM[HG] (ref 0–20)
O2 A-A PPRESDIFF RESPIRATORY: 72.1 MM[HG] (ref 0–20)
PCO2 BLDA: 33.6 MMHG (ref 35–45)
PCO2 BLDA: 37.3 MMHG (ref 35–45)
PH BLDA: 7.39 [PH] (ref 7.35–7.45)
PH BLDA: 7.42 [PH] (ref 7.35–7.45)
PLATELET # BLD AUTO: 149 THOU/UL (ref 130–400)
PO2 BLDA: 104.4 MMHG (ref 80–?)
PO2 BLDA: 171.1 MMHG (ref 80–?)
POTASSIUM BLD-SCNC: 3.96 MMOL/L (ref 3.7–5.3)
POTASSIUM BLD-SCNC: 4.13 MMOL/L (ref 3.7–5.3)
POTASSIUM SERPL-SCNC: 3.7 MMOL/L (ref 3.5–5.1)
POTASSIUM SERPL-SCNC: 4 MMOL/L (ref 3.5–5.1)
PROTHROMBIN TIME: 16.7 SEC (ref 12–14.7)
RBC # BLD AUTO: 3.89 MILL/UL (ref 4.7–6.1)
SODIUM SERPL-SCNC: 136 MMOL/L (ref 136–145)
SPECIMEN DRAWN FROM PATIENT: (no result)
SPECIMEN DRAWN FROM PATIENT: (no result)
WBC # BLD AUTO: 11 THOU/UL (ref 4.8–10.8)

## 2019-06-03 PROCEDURE — 5A1221Z PERFORMANCE OF CARDIAC OUTPUT, CONTINUOUS: ICD-10-PCS | Performed by: THORACIC SURGERY (CARDIOTHORACIC VASCULAR SURGERY)

## 2019-06-03 PROCEDURE — 021109W BYPASS CORONARY ARTERY, TWO ARTERIES FROM AORTA WITH AUTOLOGOUS VENOUS TISSUE, OPEN APPROACH: ICD-10-PCS | Performed by: THORACIC SURGERY (CARDIOTHORACIC VASCULAR SURGERY)

## 2019-06-03 PROCEDURE — 02100Z9 BYPASS CORONARY ARTERY, ONE ARTERY FROM LEFT INTERNAL MAMMARY, OPEN APPROACH: ICD-10-PCS | Performed by: THORACIC SURGERY (CARDIOTHORACIC VASCULAR SURGERY)

## 2019-06-03 PROCEDURE — 06BQ4ZZ EXCISION OF LEFT SAPHENOUS VEIN, PERCUTANEOUS ENDOSCOPIC APPROACH: ICD-10-PCS | Performed by: THORACIC SURGERY (CARDIOTHORACIC VASCULAR SURGERY)

## 2019-06-03 RX ADMIN — POTASSIUM CHLORIDE PRN MEQ: 14.9 INJECTION, SOLUTION INTRAVENOUS at 20:55

## 2019-06-03 RX ADMIN — CEFAZOLIN SODIUM SCH MLS: 2 SOLUTION INTRAVENOUS at 22:30

## 2019-06-03 RX ADMIN — ONDANSETRON PRN MG: 2 INJECTION INTRAMUSCULAR; INTRAVENOUS at 12:09

## 2019-06-03 RX ADMIN — INSULIN HUMAN SCH MLS: 100 INJECTION, SOLUTION PARENTERAL at 12:36

## 2019-06-03 RX ADMIN — FAMOTIDINE SCH MG: 10 INJECTION, SOLUTION INTRAVENOUS at 20:54

## 2019-06-03 RX ADMIN — Medication SCH: at 14:32

## 2019-06-03 RX ADMIN — CEFAZOLIN SODIUM SCH MLS: 2 SOLUTION INTRAVENOUS at 14:54

## 2019-06-03 RX ADMIN — POTASSIUM CHLORIDE PRN MEQ: 14.9 INJECTION, SOLUTION INTRAVENOUS at 13:31

## 2019-06-03 RX ADMIN — ASPIRIN SCH: 81 TABLET ORAL at 14:32

## 2019-06-03 NOTE — PDOC.CTH
Cardiology Progress Note





- Subjective





He had his CABG earlier today. Remains intubated. sedated. 








- Objective


 Vital Signs











  Temp Pulse Resp BP Pulse Ox


 


 06/03/19 03:11  97.7 F  64  18  154/77 H  97


 


 06/03/19 00:00  98.1 F  66  18  153/76 H  95








 











Admit Weight                   206 lb 12.8 oz


 


Weight                         199 lb 6.4 oz














 











 06/02/19 06/03/19 06/04/19





 06:59 06:59 06:59


 


Intake Total 1680 480 


 


Balance 1680 480 














- Physical Examination


General/Neuro: other: (S/I)


Neck: no JVD present


Lungs: CTA, unlabored respirations


Heart: RRR


Abdomen: NT/ND


Extremities: + edema B (1+)





- Telemetry


Telemetry Rhythm: NSR





- Labs


Result Diagrams: 


 06/03/19 17:23





 06/03/19 17:23


 Troponin/CKMB











Troponin I  Less than  0.010 ng/mL (< 0.028)   05/29/19  02:02    














- Assessment/Plan





1. Severer multivessel CAD.


2. HTN


3. T2DM


4. S/P CABG x 2





PLAN:


- Continue supportive care


- ASA/statin for life. 


- BB/ACEI once BP allows.

## 2019-06-03 NOTE — EKG
Test Reason : S/P CABG

Blood Pressure : ***/*** mmHG

Vent. Rate : 065 BPM     Atrial Rate : 065 BPM

   P-R Int : 170 ms          QRS Dur : 096 ms

    QT Int : 428 ms       P-R-T Axes : 025 018 117 degrees

   QTc Int : 445 ms

 

Normal sinus rhythm

Possible Inferior infarct (cited on or before 28-MAY-2019)

T wave abnormality, consider lateral ischemia

Abnormal ECG

When compared with ECG of 28-MAY-2019 20:10, (Unconfirmed)

Premature ventricular complexes are no longer Present

Nonspecific T wave abnormality has replaced inverted T waves in Anterior leads

Confirmed by SPENCER GRACIA, DR. RONDON ([id number]) on 6/3/2019 5:37:02 PM

 

Referred By:  EAN           Confirmed By:DR. NELA PALMER MD

## 2019-06-03 NOTE — PDOC.PN
- Subjective


Encounter Start Date: 06/03/19


Encounter Start Time: 14:00


Subjective: Patient still not back to the ICU after CABG





- Objective


Resuscitation Status - Order Detail:





05/29/19 01:40


Resuscitation Status Routine 


   Co-Sign Provider: 


   Resuscitation Status: FULL: Full Resuscitation








MAR Reviewed: Yes


Vital Signs & Weight: 


 Vital Signs (12 hours)











  Temp Pulse Resp BP Pulse Ox


 


 06/03/19 03:11  97.7 F  64  18  154/77 H  97


 


 06/03/19 00:00  98.1 F  66  18  153/76 H  95








 Weight











Admit Weight                   206 lb 12.8 oz


 


Weight                         199 lb 6.4 oz














I&O: 


 











 06/02/19 06/03/19 06/04/19





 06:59 06:59 06:59


 


Intake Total 1680 480 


 


Balance 1680 480 











Result Diagrams: 


 06/03/19 12:17





 06/03/19 11:45


Additional Labs: 


 Accuchecks











  06/03/19 06/03/19 06/03/19





  10:25 09:56 08:27


 


POC Glucose  211 H  181 H  197 H














  06/03/19 06/02/19 06/02/19





  07:32 20:57 17:04


 


POC Glucose  224 H  352 H  225 H














  06/02/19





  11:10


 


POC Glucose  306 H














Dx/Plan


(1) CAD (coronary artery disease)


Code(s): I25.10 - ATHSCL HEART DISEASE OF NATIVE CORONARY ARTERY W/O ANG PCTRS 

  Status: Acute   


Qualifiers: 


   Coronary Disease-Associated Artery/Lesion type: native artery 


Comment: severe multivessel, CABG this morning   





(2) DM type 2 (diabetes mellitus, type 2)


Status: Chronic   





(3) HTN (hypertension)


Code(s): I10 - ESSENTIAL (PRIMARY) HYPERTENSION   Status: Chronic   


Qualifiers: 


   Hypertension type: essential hypertension   Qualified Code(s): I10 - 

Essential (primary) hypertension   





- Plan


cont current plan of care





* .








- Discharge Day


Encounter end time: 14:05

## 2019-06-03 NOTE — RAD
EXAM: Single view of the chest



HISTORY:   Status post open heart surgery



COMPARISON: None



FINDINGS: Single view of the chest shows a normal sized cardiomediastinal silhouette.  The patient is
 status post CABG. An endotracheal tube is seen with its tip between the clavicles. A right

subclavian central venous catheter is seen with its tip at the atriocaval junction. No pneumothorax i
s seen. There is no evidence of consolidation, mass, or pleural effusion. The bones are

unremarkable.



IMPRESSION: Appropriate position of lines and tubes status post sternotomy.



Reported By: Satinder Martin 

Electronically Signed:  6/3/2019 11:59 AM

## 2019-06-03 NOTE — OP
DATE OF PROCEDURE:  06/03/2019



PREOPERATIVE DIAGNOSIS:  Coronary artery disease, unstable angina.



PROCEDURE PERFORMED:  Coronary artery bypass graft x3, good quality left internal

mammary artery to a 2 mm left anterior descending, good quality saphenous vein to a

1.25 mm diagonal, 1.25 mm distal obtuse marginal 2. 



ASSISTANT:  Mikey Tobias MD.



TRANSFUSION:  None.



DESCRIPTION OF PROCEDURE:  After adequate anesthesia had been obtained, the patient

was prepped and draped.  Midline sternotomy was performed by myself.  Dr. Tobias did an endovascular vein harvest.  Following the sternotomy, the left

internal mammary artery was harvested without entering the left pleura.  The patient

was heparinized, mammary divided distally, and passed posterior to the thymus gland

through a hole in the pericardium.  It was treated with intraluminal papaverine.

Aorta and right atrium were cannulated.  Cardiopulmonary bypass begun and vessels

inspected for grafting.  A liter of cold blood cardioplegia was given through the

aortic root; however, I suspect there was some moderate AI as the left ventricle

periodically became dilated and had to be decompressed.  Unfortunately on AYANNA, it

was not documented whether he had AI intraoperatively.  Following completion of the

cardioplegia, 3 distal anastomosis were completed.  Cross-clamp was removed and the

partial occluding clamp placed, and 2 venous anastomoses performed on the aortic

root and marked with rings.  The patient was then weaned from cardiopulmonary

bypass.  Cannula was removed, protamine given systemically and the aortic

cannulation site secured with an additional 4-0 Prolene suture.  Two #24 drains were

placed in the pericardium, following which the sternum was reapproximated with #7

interrupted wire using vancomycin paste on the sternal edges, platelet rich blood

and platelet poor plasma.  Subcutaneous tissue and skin were closed in layers. 







Job ID:  [id number]

## 2019-06-03 NOTE — CON
DATE OF CONSULTATION:  



HISTORY OF PRESENT ILLNESS:  Mr. Thibodaeux is a very pleasant 64-year-old male, who

underwent coronary artery bypass grafting today. 



He is awakening after surgery. 



He is in no distress. 



He moves all his extremities.



PAST MEDICAL HISTORY:  Remarkable for tonsillectomy. 



He is a nonsmoker.  He does have diabetes.



FAMILY HISTORY:  Negative for lung disease in early age.



REVIEW OF SYSTEMS:  Not obtainable as he is intubated.



PHYSICAL EXAMINATION:

VITAL SIGNS:  Blood pressure 160/77.  He is on a nitroglycerin drip.  Heart rate is

80, respiratory rate is 15, and oximetry is 100%. 

HEENT:  Pupils are equal.  Sclerae are anicteric. 

NECK:  Supple. 

LUNGS:  Clear. 

HEART:  Regular rhythm.  S1 and S2 are normal. 

ABDOMEN:  Soft and nontender. 

EXTREMITIES:  Without clubbing, cyanosis, or edema.



DIAGNOSTIC DATA:  Chest x-ray shows no infiltrates.  Proper placement of tubes and

lines. 



LABORATORY DATA:  White count 11, hemoglobin 12.6, and platelets 149.  Sodium 136,

potassium 3.7, chloride 106, bicarb 23, BUN 14, and creatinine 0.85.  A pH of 7.42,

CO2 of 33, and pO2 of 171. 



IMPRESSION AND PLAN:  Status post coronary artery bypass grafting.  He should easily

wean per protocol.  We will be happy to follow the other physicians while he is in

the ICU. 



TIME SPENT:  A 70-minute consult, 50% of the time spent on the unit coordinating

care. 







Job ID:  [id number]

## 2019-06-04 LAB
ANION GAP SERPL CALC-SCNC: 11 MMOL/L (ref 10–20)
BASOPHILS # BLD AUTO: 0 THOU/UL (ref 0–0.2)
BASOPHILS NFR BLD AUTO: 0.1 % (ref 0–1)
BUN SERPL-MCNC: 13 MG/DL (ref 8.4–25.7)
CALCIUM SERPL-MCNC: 8.3 MG/DL (ref 7.8–10.44)
CHLORIDE SERPL-SCNC: 108 MMOL/L (ref 98–107)
CO2 SERPL-SCNC: 22 MMOL/L (ref 23–31)
CREAT CL PREDICTED SERPL C-G-VRATE: 121 ML/MIN (ref 70–130)
EOSINOPHIL # BLD AUTO: 0 THOU/UL (ref 0–0.7)
EOSINOPHIL NFR BLD AUTO: 0.2 % (ref 0–10)
GLUCOSE SERPL-MCNC: 127 MG/DL (ref 80–115)
HGB BLD-MCNC: 12.2 G/DL (ref 14–18)
LYMPHOCYTES # BLD: 1.9 THOU/UL (ref 1.2–3.4)
LYMPHOCYTES NFR BLD AUTO: 19.1 % (ref 21–51)
MCH RBC QN AUTO: 32.2 PG (ref 27–31)
MCV RBC AUTO: 91.9 FL (ref 78–98)
MONOCYTES # BLD AUTO: 0.9 THOU/UL (ref 0.11–0.59)
MONOCYTES NFR BLD AUTO: 9.5 % (ref 0–10)
NEUTROPHILS # BLD AUTO: 7.1 THOU/UL (ref 1.4–6.5)
NEUTROPHILS NFR BLD AUTO: 71.2 % (ref 42–75)
PLATELET # BLD AUTO: 153 THOU/UL (ref 130–400)
POTASSIUM SERPL-SCNC: 3.7 MMOL/L (ref 3.5–5.1)
RBC # BLD AUTO: 3.8 MILL/UL (ref 4.7–6.1)
SODIUM SERPL-SCNC: 137 MMOL/L (ref 136–145)
WBC # BLD AUTO: 9.9 THOU/UL (ref 4.8–10.8)

## 2019-06-04 RX ADMIN — INSULIN HUMAN SCH MLS: 100 INJECTION, SOLUTION PARENTERAL at 11:30

## 2019-06-04 RX ADMIN — INSULIN HUMAN PRN UNITS: 100 INJECTION, SOLUTION PARENTERAL at 21:06

## 2019-06-04 RX ADMIN — FAMOTIDINE SCH MG: 10 INJECTION, SOLUTION INTRAVENOUS at 08:01

## 2019-06-04 RX ADMIN — CEFAZOLIN SODIUM SCH MLS: 2 SOLUTION INTRAVENOUS at 06:40

## 2019-06-04 RX ADMIN — INSULIN HUMAN PRN UNITS: 100 INJECTION, SOLUTION PARENTERAL at 17:23

## 2019-06-04 RX ADMIN — ONDANSETRON PRN MG: 2 INJECTION INTRAMUSCULAR; INTRAVENOUS at 01:38

## 2019-06-04 RX ADMIN — POTASSIUM CHLORIDE PRN MEQ: 14.9 INJECTION, SOLUTION INTRAVENOUS at 08:04

## 2019-06-04 RX ADMIN — ONDANSETRON PRN MG: 2 INJECTION INTRAMUSCULAR; INTRAVENOUS at 08:17

## 2019-06-04 NOTE — PDOC.CTH
Cardiology Progress Note





- Subjective





Doing better today. Sore chest. No BM or gas yet. On clear liquid diet.





- Objective


 Vital Signs











  Temp


 


 06/04/19 08:00  98.9 F


 


 06/04/19 04:00  98.8 F


 


 06/04/19 00:00  99.7 F H








 











Admit Weight                   206 lb 12.8 oz


 


Weight                         200 lb 6.403 oz














 











 06/03/19 06/04/19 06/05/19





 06:59 06:59 06:59


 


Intake Total 480 2416.3 


 


Output Total  2193 25


 


Balance 480 223.3 -25














- Physical Examination


General/Neuro: alert & oriented x3, NAD


Neck: no JVD present


Lungs: CTA, unlabored respirations


Heart: RRR


Abdomen: NT/ND


Extremities: + edema B (1+)





- Telemetry


Telemetry Rhythm: NSR





- Labs


Result Diagrams: 


 06/04/19 04:25





 06/04/19 04:25


 Troponin/CKMB











Troponin I  Less than  0.010 ng/mL (< 0.028)   05/29/19  02:02    














- Assessment/Plan





1. Severer multivessel CAD.


2. HTN


3. T2DM


4. S/P CABG x 3, LIMA to LAD, SVG to D1, SVG to OM2





PLAN:


- Continue supportive care


- ASA/statin for life. 


- BB/ACEI once BP allows.


- PT as tolerated.

## 2019-06-04 NOTE — PRG
DATE OF SERVICE:  06/04/2019



SUBJECTIVE:  Richy Thibodeaux did well overnight.  He has no complaints.  He is

sitting in a bedside chair this morning.  He still has chest tube in place. 



OBJECTIVE:  GENERAL:  He is in no distress. 

VITAL SIGNS:  Heart rates in the 80s.  He is in sinus rhythm.  Blood pressure

144/76, respiratory rates in the teens, oximetry is 99% to 100%. 

LUNGS:  Clear. 

HEART:  Regular rhythm.  S1 and S2 normal. 

ABDOMEN:  Soft and nontender. 

EXTREMITIES:  Without edema.



LABORATORY DATA:  White count 9.9, hemoglobin 12.2, and platelets 153.  Sodium 137,

potassium 3.7, chloride 108, bicarb 22, BUN 13, and creatinine 0.78. 



IMPRESSION:  

1. Status post coronary artery bypass grafting.

2. Diabetes. 

Overall, he appears to be doing well.  I would think he would be stable to transfer

out of the Critical Care Unit sometime soon. 







Job ID:  [id number]

## 2019-06-04 NOTE — PDOC.PN
- Subjective


Encounter Start Date: 06/04/19


Encounter Start Time: 15:50


Subjective: Patient a little sore in chest area. Throat a bit raw from surgery, 

but 


-: improving. Getting up with PT. No SOB. No cough.





- Objective


Resuscitation Status - Order Detail:





05/29/19 01:40


Resuscitation Status Routine 


   Co-Sign Provider: 


   Resuscitation Status: FULL: Full Resuscitation








MAR Reviewed: Yes


Vital Signs & Weight: 


 Vital Signs (12 hours)











  Temp


 


 06/04/19 08:00  98.9 F


 


 06/04/19 04:00  98.8 F


 


 06/04/19 00:00  99.7 F H








 Weight











Admit Weight                   206 lb 12.8 oz


 


Weight                         200 lb 6.403 oz











 Most Recent Monitor Data











Heart Rate from ECG            72


 


NIBP                           119/68


 


NIBP BP-Mean                   85


 


Respiration from ECG           13


 


SpO2                           95














I&O: 


 











 06/03/19 06/04/19 06/05/19





 06:59 06:59 06:59


 


Intake Total 480 2416.3 


 


Output Total  2193 25


 


Balance 480 223.3 -25











Result Diagrams: 


 06/04/19 04:25





 06/04/19 04:25


Additional Labs: 


 Accuchecks











  06/04/19 06/04/19 06/04/19





  08:01 06:36 05:33


 


POC Glucose  165 H  143 H  133 H














  06/04/19 06/04/19 06/04/19





  04:45 03:26 02:18


 


POC Glucose  130 H  113 H  103














  06/04/19 06/04/19 06/03/19





  01:33 00:33 23:27


 


POC Glucose  112 H  118 H  137 H














  06/03/19 06/03/19 06/03/19





  22:20 21:24 20:38


 


POC Glucose  146 H  163 H  182 H














  06/03/19 06/03/19 06/03/19





  19:16 18:04 16:38


 


POC Glucose  109  123 H  157 H














  06/03/19 06/03/19 06/03/19





  15:17 13:42 12:01


 


POC Glucose  192 H  211 H  266 H














  06/03/19 06/03/19 06/03/19





  11:23 10:25 09:56


 


POC Glucose  192 H  211 H  181 H














  06/03/19 06/03/19





  09:29 06:20


 


POC Glucose  210 H  252 H














Phys Exam





- Physical Examination


Constitutional: NAD


HEENT: moist MMs


Respiratory: no wheezing, no rales, no rhonchi, clear to auscultation bilateral


sternotomy with dressing c/d/i


Cardiovascular: RRR, no significant murmur


Gastrointestinal: soft, positive bowel sounds


Musculoskeletal: no edema


LLE with postop dressing in place c/d/i


Neurological: non-focal, moves all 4 limbs


Psychiatric: normal affect, A&O x 3





Dx/Plan


(1) CAD (coronary artery disease)


Code(s): I25.10 - ATHSCL HEART DISEASE OF NATIVE CORONARY ARTERY W/O ANG PCTRS 

  Status: Acute   


Qualifiers: 


   Coronary Disease-Associated Artery/Lesion type: native artery 


Comment: severe multivessel, CABG done 6/3/19   





(2) DM type 2 (diabetes mellitus, type 2)


Status: Chronic   





(3) HTN (hypertension)


Code(s): I10 - ESSENTIAL (PRIMARY) HYPERTENSION   Status: Chronic   


Qualifiers: 


   Hypertension type: essential hypertension   Qualified Code(s): I10 - 

Essential (primary) hypertension   





- Plan


cont current plan of care, PT/OT


routine post CABG rehab





* .








- Discharge Day


Encounter end time: 16:00

## 2019-06-04 NOTE — RAD
CHEST ONE VIEW:

 

Indication: History of open heart surgery. 

 

Comparison: 6-3-19

 

FINDINGS: 

Since the comparison examination the patient has been extubated. Right sided subclavian central venou
s catheter is unchanged. Midline sternotomy changes are stable appearing. Lungs are clear. No pleural
 effusion or pneumothorax is evident. 

 

IMPRESSION: 

Interval extubation. No acute abnormality. 

 

POS: BH

## 2019-06-05 LAB
ANION GAP SERPL CALC-SCNC: 11 MMOL/L (ref 10–20)
BASOPHILS # BLD AUTO: 0 THOU/UL (ref 0–0.2)
BASOPHILS NFR BLD AUTO: 0.2 % (ref 0–1)
BUN SERPL-MCNC: 13 MG/DL (ref 8.4–25.7)
CALCIUM SERPL-MCNC: 9 MG/DL (ref 7.8–10.44)
CHLORIDE SERPL-SCNC: 102 MMOL/L (ref 98–107)
CO2 SERPL-SCNC: 26 MMOL/L (ref 23–31)
CREAT CL PREDICTED SERPL C-G-VRATE: 115 ML/MIN (ref 70–130)
EOSINOPHIL # BLD AUTO: 0.1 THOU/UL (ref 0–0.7)
EOSINOPHIL NFR BLD AUTO: 0.7 % (ref 0–10)
GLUCOSE SERPL-MCNC: 176 MG/DL (ref 80–115)
HGB BLD-MCNC: 12.3 G/DL (ref 14–18)
LYMPHOCYTES # BLD: 2 THOU/UL (ref 1.2–3.4)
LYMPHOCYTES NFR BLD AUTO: 23.8 % (ref 21–51)
MCH RBC QN AUTO: 32.4 PG (ref 27–31)
MCV RBC AUTO: 92.3 FL (ref 78–98)
MONOCYTES # BLD AUTO: 0.9 THOU/UL (ref 0.11–0.59)
MONOCYTES NFR BLD AUTO: 9.9 % (ref 0–10)
NEUTROPHILS # BLD AUTO: 5.6 THOU/UL (ref 1.4–6.5)
NEUTROPHILS NFR BLD AUTO: 65.3 % (ref 42–75)
PLATELET # BLD AUTO: 127 THOU/UL (ref 130–400)
POTASSIUM SERPL-SCNC: 4.1 MMOL/L (ref 3.5–5.1)
RBC # BLD AUTO: 3.79 MILL/UL (ref 4.7–6.1)
SODIUM SERPL-SCNC: 135 MMOL/L (ref 136–145)
WBC # BLD AUTO: 8.6 THOU/UL (ref 4.8–10.8)

## 2019-06-05 RX ADMIN — INSULIN HUMAN PRN UNIT: 100 INJECTION, SOLUTION PARENTERAL at 20:28

## 2019-06-05 RX ADMIN — INSULIN HUMAN PRN UNIT: 100 INJECTION, SOLUTION PARENTERAL at 11:36

## 2019-06-05 RX ADMIN — ONDANSETRON PRN MG: 2 INJECTION INTRAMUSCULAR; INTRAVENOUS at 04:24

## 2019-06-05 RX ADMIN — INSULIN HUMAN PRN UNITS: 100 INJECTION, SOLUTION PARENTERAL at 06:20

## 2019-06-05 RX ADMIN — INSULIN HUMAN PRN UNIT: 100 INJECTION, SOLUTION PARENTERAL at 17:12

## 2019-06-05 NOTE — PDOC.CTH
Cardiology Progress Note





- Subjective





Chest tubes pulled out and feels better now. Working with PT. Passing gas but 

no BM yet. 





- Objective


 Vital Signs











  Temp Pulse Pulse BP BP Pulse Ox Pulse Ox


 


 06/05/19 16:00  99.2 F      


 


 06/05/19 13:10   74  72  132/65  116/62  96  97


 


 06/05/19 11:36  98.8 F      


 


 06/05/19 09:28   83  70  130/75  125/66  97  97








 











Admit Weight                   206 lb 12.8 oz


 


Weight                         200 lb 6.403 oz














 











 06/04/19 06/05/19 06/06/19





 06:59 06:59 06:59


 


Intake Total 2416.3 2193.2 2240


 


Output Total 2193 2785 680


 


Balance 223.3 -591.8 1560














- Physical Examination


General/Neuro: alert & oriented x3, NAD


Neck: no JVD present


Lungs: CTA, unlabored respirations


Heart: RRR


Abdomen: NT/ND


Extremities: + edema B (1+)





- Telemetry


Telemetry Rhythm: NSR





- Labs


Result Diagrams: 


 06/05/19 04:15





 06/05/19 03:30


 Troponin/CKMB











Troponin I  Less than  0.010 ng/mL (< 0.028)   05/29/19  02:02    














- Assessment/Plan





1. Severer multivessel CAD.


2. HTN


3. T2DM


4. S/P CABG x 3, LIMA to LAD, SVG to D1, SVG to OM2





PLAN:


- ASA/statin for life. 


- BB/ACEI.


- PT as tolerated. 


- May transfer to telemetry.

## 2019-06-05 NOTE — RAD
CHEST 1 VIEW:

 

HISTORY: 

Heart surgery.  Followup.

 

COMPARISON: 

6/4/2019.

 

FINDINGS: 

Cardiac silhouette remains magnified and upper limits of normal in size.  The patient is slightly rot
ated leftward on this exam.  Pulmonary vasculature upper limits of normal.  Postoperative changes of 
the mediastinum evident.  Calcification over the aorta.  Right subclavian central venous catheter is 
in place.  Numerous metallic lines overlie the chest, obscuring detail.

 

IMPRESSION: 

Stable postoperative appearance of the chest.

 

POS: CET

## 2019-06-05 NOTE — PDOC.PN
- Subjective


Encounter Start Date: 06/05/19


Encounter Start Time: 14:50


Subjective: Patient doing well. Chest wall pain improved. No cough. No SOB. 

Getting up 


-: well with PT. Chest tubes out this morning.





- Objective


Resuscitation Status - Order Detail:





05/29/19 01:40


Resuscitation Status Routine 


   Co-Sign Provider: 


   Resuscitation Status: FULL: Full Resuscitation








MAR Reviewed: Yes


Vital Signs & Weight: 


 Vital Signs (12 hours)











  Temp Pulse Pulse BP BP Pulse Ox Pulse Ox


 


 06/05/19 09:28   83  70  130/75  125/66   97


 


 06/05/19 07:00  98.7 F      


 


 06/05/19 06:47       95 


 


 06/05/19 04:00  98.6 F      


 


 06/05/19 00:00  98.5 F      














  Pulse Ox


 


 06/05/19 09:28  97


 


 06/05/19 07:00 


 


 06/05/19 06:47 


 


 06/05/19 04:00 


 


 06/05/19 00:00 








 Weight











Admit Weight                   206 lb 12.8 oz


 


Weight                         200 lb 6.403 oz











 Most Recent Monitor Data











Heart Rate from ECG            64


 


NIBP                           124/72


 


NIBP BP-Mean                   89


 


Respiration from ECG           19


 


SpO2                           93














I&O: 


 











 06/04/19 06/05/19 06/06/19





 06:59 06:59 06:59


 


Intake Total 2416.3 2193.2 1000


 


Output Total 2193 2785 430


 


Balance 223.3 -591.8 570











Result Diagrams: 


 06/05/19 04:15





 06/05/19 03:30


Additional Labs: 


 Accuchecks











  06/04/19 06/04/19 06/04/19





  21:07 17:13 14:17


 


POC Glucose  196 H  140 H  144 H














  06/04/19





  13:01


 


POC Glucose  179 H














Phys Exam





- Physical Examination


Constitutional: NAD


HEENT: moist MMs


Respiratory: no wheezing, no rales, no rhonchi


sternotomy incision with dressing C/D/I


Cardiovascular: RRR, no significant murmur


Gastrointestinal: soft, positive bowel sounds


Neurological: non-focal, moves all 4 limbs





Dx/Plan


(1) CAD (coronary artery disease)


Code(s): I25.10 - ATHSCL HEART DISEASE OF NATIVE CORONARY ARTERY W/O ANG PCTRS 

  Status: Acute   


Qualifiers: 


   Coronary Disease-Associated Artery/Lesion type: native artery 


Comment: severe multivessel, CABG done 6/3/19   





(2) DM type 2 (diabetes mellitus, type 2)


Status: Chronic   





(3) HTN (hypertension)


Code(s): I10 - ESSENTIAL (PRIMARY) HYPERTENSION   Status: Chronic   


Qualifiers: 


   Hypertension type: essential hypertension   Qualified Code(s): I10 - 

Essential (primary) hypertension   





- Plan


cont current plan of care, PT/OT


routine post CABG care, can go to the floor when ok with CV surgery





* .








- Discharge Day


Encounter end time: 15:00

## 2019-06-06 RX ADMIN — INSULIN HUMAN PRN UNIT: 100 INJECTION, SOLUTION PARENTERAL at 12:21

## 2019-06-06 RX ADMIN — INSULIN HUMAN PRN UNIT: 100 INJECTION, SOLUTION PARENTERAL at 06:15

## 2019-06-06 NOTE — PDOC.CTH
Cardiology Progress Note





- Subjective





Doing well. Working well with PT. No BM yet but passing gas. 





- Objective


 Vital Signs











  Temp Pulse Pulse Pulse BP BP BP


 


 06/06/19 13:48    66  66   147/71 H  144/71 H


 


 06/06/19 09:20    58 L  64   148/76 H  146/81 H


 


 06/06/19 08:57    65  65   148/75 H  149/69 H


 


 06/06/19 08:00  97.8 F  65    145/73 H  














  Pulse Ox


 


 06/06/19 13:48 


 


 06/06/19 09:20 


 


 06/06/19 08:57 


 


 06/06/19 08:00  100








 











Admit Weight                   206 lb 12.8 oz


 


Weight                         204 lb 2.369 oz














 











 06/05/19 06/06/19 06/07/19





 06:59 06:59 06:59


 


Intake Total 2193.2 2540 520


 


Output Total 2785 2505 1475


 


Balance -591.8 35 -955














- Physical Examination


General/Neuro: alert & oriented x3, NAD


Neck: no JVD present


Lungs: CTA, unlabored respirations


Heart: RRR


Abdomen: NT/ND


Extremities: + edema B (1+)





- Telemetry


Telemetry Rhythm: NSR





- Labs


Result Diagrams: 


 06/05/19 04:15





 06/05/19 03:30


 Troponin/CKMB











Troponin I  Less than  0.010 ng/mL (< 0.028)   05/29/19  02:02    














- Assessment/Plan





1. Severer multivessel CAD.


2. HTN


3. T2DM


4. S/P CABG x 3, LIMA to LAD, SVG to D1, SVG to OM2





PLAN:


- ASA/statin for life. 


- BB/ACEI.


- PT as tolerated.

## 2019-06-06 NOTE — PDOC.PN
- Subjective


Encounter Start Date: 06/06/19


Encounter Start Time: 11:00


Subjective: Patient doing well. Ambulating well with PT. Awaiting bed to 

transfer


-: out of the unit.





- Objective


Resuscitation Status - Order Detail:





05/29/19 01:40


Resuscitation Status Routine 


   Co-Sign Provider: 


   Resuscitation Status: FULL: Full Resuscitation








MAR Reviewed: Yes


Vital Signs & Weight: 


 Vital Signs (12 hours)











  Temp Pulse BP


 


 06/06/19 08:00   65  145/73 H


 


 06/06/19 04:00  99.0 F  


 


 06/06/19 00:00  99.8 F H  








 Weight











Admit Weight                   206 lb 12.8 oz


 


Weight                         204 lb 2.369 oz











 Most Recent Monitor Data











Heart Rate from ECG            62


 


NIBP                           144/79


 


NIBP BP-Mean                   100


 


Respiration from ECG           10


 


SpO2                           97














I&O: 


 











 06/05/19 06/06/19 06/07/19





 06:59 06:59 06:59


 


Intake Total 2193.2 2540 


 


Output Total 2785 2505 


 


Balance -591.8 35 











Result Diagrams: 


 06/05/19 04:15





 06/05/19 03:30


Additional Labs: 


 Accuchecks











  06/06/19 06/05/19 06/05/19





  06:15 20:28 17:14


 


POC Glucose  174 H  193 H  173 H














  06/05/19 06/05/19





  11:37 06:17


 


POC Glucose  207 H  234 H














Phys Exam





- Physical Examination


Constitutional: NAD


HEENT: moist MMs


Respiratory: no wheezing, no rales, no rhonchi


sternotomy incision with dressing c/d/i


Cardiovascular: RRR, no significant murmur


Gastrointestinal: soft, positive bowel sounds


Neurological: non-focal


Psychiatric: normal affect, A&O x 3





Dx/Plan


(1) CAD (coronary artery disease)


Code(s): I25.10 - ATHSCL HEART DISEASE OF NATIVE CORONARY ARTERY W/O ANG PCTRS 

  Status: Acute   


Qualifiers: 


   Coronary Disease-Associated Artery/Lesion type: native artery 


Comment: severe multivessel, CABG done 6/3/19   





(2) DM type 2 (diabetes mellitus, type 2)


Status: Chronic   





(3) HTN (hypertension)


Code(s): I10 - ESSENTIAL (PRIMARY) HYPERTENSION   Status: Chronic   


Qualifiers: 


   Hypertension type: essential hypertension   Qualified Code(s): I10 - 

Essential (primary) hypertension   





- Plan


cont current plan of care, PT/OT


disposition as per CT surgery and cardiology





* .








- Discharge Day


Encounter end time: 11:15

## 2019-06-07 VITALS — SYSTOLIC BLOOD PRESSURE: 103 MMHG | TEMPERATURE: 98 F | DIASTOLIC BLOOD PRESSURE: 57 MMHG

## 2019-06-07 NOTE — PDOC.PN
- Subjective


Encounter Start Date: 06/07/19


Encounter Start Time: 11:36





Doing well overall. No BM yet. Feels like he needs to. Ambulating a bit. 





- Objective


Resuscitation Status - Order Detail:





05/29/19 01:40


Resuscitation Status Routine 


   Co-Sign Provider: 


   Resuscitation Status: FULL: Full Resuscitation








Vital Signs & Weight: 


 Vital Signs (12 hours)











  Temp Pulse Pulse Pulse Resp BP BP


 


 06/07/19 09:46    72  67   138/74  142/72 H


 


 06/07/19 09:18   63     


 


 06/07/19 04:00  98.7 F  63    18  














  BP Pulse Ox


 


 06/07/19 09:46  


 


 06/07/19 09:18  


 


 06/07/19 04:00  159/80 H  93 L








 Weight











Admit Weight                   206 lb 12.8 oz


 


Weight                         202 lb 3.2 oz











 Most Recent Monitor Data











Heart Rate from ECG            63


 


NIBP                           140/73


 


NIBP BP-Mean                   95


 


Respiration from ECG           18


 


SpO2                           100














I&O: 


 











 06/06/19 06/07/19 06/08/19





 06:59 06:59 06:59


 


Intake Total 2540 1360 240


 


Output Total 2505 2625 


 


Balance 35 -1265 240











Result Diagrams: 


 06/05/19 04:15





 06/05/19 03:30


Additional Labs: 


 Accuchecks











  06/07/19 06/06/19 06/06/19





  05:25 20:17 16:53


 


POC Glucose  119 H  116 H  73














  06/06/19





  11:35


 


POC Glucose  208 H














Phys Exam





- Physical Examination


Constitutional: NAD


Respiratory: no wheezing, no rales, no rhonchi, clear to auscultation bilateral


Cardiovascular: RRR, no significant murmur, no rub


Gastrointestinal: soft, non-tender, no distention, positive bowel sounds


Musculoskeletal: no edema


Psychiatric: normal affect, A&O x 3


Skin: no rash





Dx/Plan


(1) CAD (coronary artery disease)


Code(s): I25.10 - ATHSCL HEART DISEASE OF NATIVE CORONARY ARTERY W/O ANG PCTRS 

  Status: Acute   


Qualifiers: 


   Coronary Disease-Associated Artery/Lesion type: native artery 


Comment: severe multivessel, CABG done 6/3/19   





(2) DM type 2 (diabetes mellitus, type 2)


Status: Chronic   





(3) HTN (hypertension)


Code(s): I10 - ESSENTIAL (PRIMARY) HYPERTENSION   Status: Chronic   


Qualifiers: 


   Hypertension type: essential hypertension   Qualified Code(s): I10 - 

Essential (primary) hypertension   





(4) S/P CABG x 3


Code(s): Z95.1 - PRESENCE OF AORTOCORONARY BYPASS GRAFT   Status: Acute   





- Plan





* Doing well post CABG x 3 vessels. Small targets.


* Encouraged ambulation, IS.


* Rehab pending.


* Blood sugars are generally well controlled.

## 2019-06-07 NOTE — PDOC.CTH
Cardiology Progress Note





- Subjective





Doing very well. Walking alone and with PT without issues. Passing gas but no 

BM yet and feels he will go soon. 





- Objective


 Vital Signs











  Pulse Pulse Pulse BP BP


 


 06/07/19 09:46   72  67  138/74  142/72 H


 


 06/07/19 09:18  63    








 











Admit Weight                   206 lb 12.8 oz


 


Weight                         202 lb 3.2 oz














 











 06/06/19 06/07/19 06/08/19





 06:59 06:59 06:59


 


Intake Total 2540 1360 240


 


Output Total 2505 2625 


 


Balance 35 -1265 240














- Physical Examination


General/Neuro: alert & oriented x3, NAD


Neck: no JVD present


Lungs: unlabored respirations


Abdomen: NT/ND, other: (Good bowel sounds. )


Extremities: + edema B (Trace)





- Telemetry


Telemetry Rhythm: NSR





- Labs


Result Diagrams: 


 06/05/19 04:15





 06/05/19 03:30


 Troponin/CKMB











Troponin I  Less than  0.010 ng/mL (< 0.028)   05/29/19  02:02    














- Assessment/Plan





1. Severer multivessel CAD.


2. HTN


3. T2DM


4. S/P CABG x 3, LIMA to LAD, SVG to D1, SVG to OM2





PLAN:


- ASA/statin for life. 


- BB/ACEI.


- Increase PT as tolerated.


- Discharge any time from cardiac perspective after BM.

## 2019-06-11 LAB
ANALYZER IN CARDIO: (no result)
BASE EXCESS STD BLDA CALC-SCNC: -1.1 MEQ/L
BASE EXCESS STD BLDA CALC-SCNC: -2.4 MEQ/L
BASE EXCESS STD BLDA CALC-SCNC: -3 MEQ/L
BASE EXCESS STD BLDA CALC-SCNC: 0.7 MEQ/L
BASE EXCESS STD BLDV CALC-SCNC: 0.2 MEQ/L
CA-I BLDA-SCNC: 1.05 MMOL/L (ref 1.12–1.3)
CA-I BLDA-SCNC: 1.06 MMOL/L (ref 1.12–1.3)
CA-I BLDA-SCNC: 1.07 MMOL/L (ref 1.12–1.3)
CA-I BLDA-SCNC: 1.12 MMOL/L (ref 1.12–1.3)
CA-I BLDV-MCNC: 1.07 MMOL/L (ref 1.16–1.32)
CHLORIDE BLDV-SCNC: 104 MMOL/L (ref 98–106)
HCO3 BLDA-SCNC: 20.7 MEQ/L (ref 22–28)
HCO3 BLDA-SCNC: 21.3 MEQ/L (ref 22–28)
HCO3 BLDA-SCNC: 24.8 MEQ/L (ref 22–28)
HCO3 BLDA-SCNC: 26.9 MEQ/L (ref 22–28)
HCO3 BLDV-SCNC: 27 MEQ/L (ref 22–28)
HCT VFR BLDA CALC: 33 % (ref 42–52)
HCT VFR BLDA CALC: 34 % (ref 42–52)
HCT VFR BLDA CALC: 36 % (ref 42–52)
HCT VFR BLDA CALC: 39 % (ref 42–52)
HCT VFR BLDV CALC: 33 % (ref 44–64)
HGB BLDA-MCNC: 11.3 G/DL (ref 14–18)
HGB BLDA-MCNC: 11.4 G/DL (ref 14–18)
HGB BLDA-MCNC: 12.1 G/DL (ref 14–18)
HGB BLDA-MCNC: 13.2 G/DL (ref 14–18)
HGB BLDV-MCNC: 11.3 G/DL (ref 12.6–17.4)
PCO2 BLDA: 33 MMHG (ref 35–45)
PCO2 BLDA: 33.2 MMHG (ref 35–45)
PCO2 BLDA: 46.6 MMHG (ref 35–45)
PCO2 BLDA: 49.9 MMHG (ref 35–45)
PH BLDA: 7.34 [PH] (ref 7.35–7.45)
PH BLDA: 7.35 [PH] (ref 7.35–7.45)
PH BLDA: 7.42 [PH] (ref 7.35–7.45)
PH BLDA: 7.43 [PH] (ref 7.35–7.45)
PO2 BLDA: 367.8 MMHG (ref 80–?)
PO2 BLDA: 375.7 MMHG (ref 80–?)
PO2 BLDA: 399.4 MMHG (ref 80–?)
PO2 BLDA: 534.6 MMHG (ref 80–?)
POTASSIUM BLD-SCNC: 3.99 MMOL/L (ref 3.7–5.3)
POTASSIUM BLD-SCNC: 4.29 MMOL/L (ref 3.7–5.3)
POTASSIUM BLD-SCNC: 4.65 MMOL/L (ref 3.7–5.3)
POTASSIUM BLD-SCNC: 4.68 MMOL/L (ref 3.7–5.3)
POTASSIUM BLDV-SCNC: 5.28 MMOL/L (ref 3.7–5.3)
SODIUM BLDV-SCNC: 136.8 MMOL/L (ref 133–146)
SPECIMEN DRAWN FROM PATIENT: (no result)

## 2019-06-28 ENCOUNTER — HOSPITAL ENCOUNTER (INPATIENT)
Dept: HOSPITAL 92 - ERS | Age: 64
LOS: 5 days | Discharge: HOME | DRG: 243 | End: 2019-07-03
Attending: INTERNAL MEDICINE | Admitting: INTERNAL MEDICINE
Payer: MEDICARE

## 2019-06-28 VITALS — BODY MASS INDEX: 29.6 KG/M2

## 2019-06-28 DIAGNOSIS — E11.9: ICD-10-CM

## 2019-06-28 DIAGNOSIS — Z95.1: ICD-10-CM

## 2019-06-28 DIAGNOSIS — I25.2: ICD-10-CM

## 2019-06-28 DIAGNOSIS — I47.1: ICD-10-CM

## 2019-06-28 DIAGNOSIS — I48.0: ICD-10-CM

## 2019-06-28 DIAGNOSIS — Z95.5: ICD-10-CM

## 2019-06-28 DIAGNOSIS — Z88.8: ICD-10-CM

## 2019-06-28 DIAGNOSIS — E78.5: ICD-10-CM

## 2019-06-28 DIAGNOSIS — I48.92: ICD-10-CM

## 2019-06-28 DIAGNOSIS — I25.10: ICD-10-CM

## 2019-06-28 DIAGNOSIS — Z79.01: ICD-10-CM

## 2019-06-28 DIAGNOSIS — I49.5: Primary | ICD-10-CM

## 2019-06-28 LAB
ALBUMIN SERPL BCG-MCNC: 4.3 G/DL (ref 3.4–4.8)
ALP SERPL-CCNC: 131 U/L (ref 40–150)
ALT SERPL W P-5'-P-CCNC: 37 U/L (ref 8–55)
ANION GAP SERPL CALC-SCNC: 14 MMOL/L (ref 10–20)
AST SERPL-CCNC: 26 U/L (ref 5–34)
BASOPHILS # BLD AUTO: 0 THOU/UL (ref 0–0.2)
BASOPHILS NFR BLD AUTO: 0.7 % (ref 0–1)
BILIRUB SERPL-MCNC: 0.7 MG/DL (ref 0.2–1.2)
BUN SERPL-MCNC: 16 MG/DL (ref 8.4–25.7)
CALCIUM SERPL-MCNC: 9.6 MG/DL (ref 7.8–10.44)
CHLORIDE SERPL-SCNC: 102 MMOL/L (ref 98–107)
CK SERPL-CCNC: 41 U/L (ref 30–200)
CO2 SERPL-SCNC: 24 MMOL/L (ref 23–31)
CREAT CL PREDICTED SERPL C-G-VRATE: 0 ML/MIN (ref 70–130)
CRYSTAL-AUWI FLAG: 0 (ref 0–15)
EOSINOPHIL # BLD AUTO: 0.5 THOU/UL (ref 0–0.7)
EOSINOPHIL NFR BLD AUTO: 8.6 % (ref 0–10)
GLOBULIN SER CALC-MCNC: 3 G/DL (ref 2.4–3.5)
GLUCOSE SERPL-MCNC: 155 MG/DL (ref 80–115)
HEV IGM SER QL: 0.1 (ref 0–7.99)
HGB BLD-MCNC: 13.1 G/DL (ref 14–18)
HYALINE CASTS #/AREA URNS LPF: (no result) LPF
LYMPHOCYTES # BLD: 1.7 THOU/UL (ref 1.2–3.4)
LYMPHOCYTES NFR BLD AUTO: 31.8 % (ref 21–51)
MCH RBC QN AUTO: 32.4 PG (ref 27–31)
MCV RBC AUTO: 92.1 FL (ref 78–98)
MONOCYTES # BLD AUTO: 0.3 THOU/UL (ref 0.11–0.59)
MONOCYTES NFR BLD AUTO: 6.1 % (ref 0–10)
NEUTROPHILS # BLD AUTO: 2.8 THOU/UL (ref 1.4–6.5)
NEUTROPHILS NFR BLD AUTO: 52.8 % (ref 42–75)
PATHC CAST-AUWI FLAG: 0 (ref 0–2.49)
PLATELET # BLD AUTO: 209 THOU/UL (ref 130–400)
POTASSIUM SERPL-SCNC: 4.4 MMOL/L (ref 3.5–5.1)
RBC # BLD AUTO: 4.03 MILL/UL (ref 4.7–6.1)
RBC UR QL AUTO: (no result) HPF (ref 0–3)
SODIUM SERPL-SCNC: 136 MMOL/L (ref 136–145)
SPERM-AUWI FLAG: 0 (ref 0–9.9)
TROPONIN I SERPL DL<=0.01 NG/ML-MCNC: 0.04 NG/ML (ref ?–0.03)
TROPONIN I SERPL DL<=0.01 NG/ML-MCNC: 0.06 NG/ML (ref ?–0.03)
WBC # BLD AUTO: 5.4 THOU/UL (ref 4.8–10.8)
WBC UR QL AUTO: (no result) HPF (ref 0–3)
YEAST-AUWI FLAG: 0 (ref 0–25)

## 2019-06-28 PROCEDURE — 93005 ELECTROCARDIOGRAM TRACING: CPT

## 2019-06-28 PROCEDURE — 76942 ECHO GUIDE FOR BIOPSY: CPT

## 2019-06-28 PROCEDURE — C1785 PMKR, DUAL, RATE-RESP: HCPCS

## 2019-06-28 PROCEDURE — C1898 LEAD, PMKR, OTHER THAN TRANS: HCPCS

## 2019-06-28 PROCEDURE — 85025 COMPLETE CBC W/AUTO DIFF WBC: CPT

## 2019-06-28 PROCEDURE — 93010 ELECTROCARDIOGRAM REPORT: CPT

## 2019-06-28 PROCEDURE — 71275 CT ANGIOGRAPHY CHEST: CPT

## 2019-06-28 PROCEDURE — 99152 MOD SED SAME PHYS/QHP 5/>YRS: CPT

## 2019-06-28 PROCEDURE — 93613 INTRACARDIAC EPHYS 3D MAPG: CPT

## 2019-06-28 PROCEDURE — 83690 ASSAY OF LIPASE: CPT

## 2019-06-28 PROCEDURE — C1730 CATH, EP, 19 OR FEW ELECT: HCPCS

## 2019-06-28 PROCEDURE — 36416 COLLJ CAPILLARY BLOOD SPEC: CPT

## 2019-06-28 PROCEDURE — 82550 ASSAY OF CK (CPK): CPT

## 2019-06-28 PROCEDURE — 36415 COLL VENOUS BLD VENIPUNCTURE: CPT

## 2019-06-28 PROCEDURE — 84484 ASSAY OF TROPONIN QUANT: CPT

## 2019-06-28 PROCEDURE — C1732 CATH, EP, DIAG/ABL, 3D/VECT: HCPCS

## 2019-06-28 PROCEDURE — 80048 BASIC METABOLIC PNL TOTAL CA: CPT

## 2019-06-28 PROCEDURE — 33249 INSJ/RPLCMT DEFIB W/LEAD(S): CPT

## 2019-06-28 PROCEDURE — 81001 URINALYSIS AUTO W/SCOPE: CPT

## 2019-06-28 PROCEDURE — 99153 MOD SED SAME PHYS/QHP EA: CPT

## 2019-06-28 PROCEDURE — 71045 X-RAY EXAM CHEST 1 VIEW: CPT

## 2019-06-28 PROCEDURE — 93798 PHYS/QHP OP CAR RHAB W/ECG: CPT

## 2019-06-28 PROCEDURE — 93623 PRGRMD STIMJ&PACG IV RX NFS: CPT

## 2019-06-28 PROCEDURE — 80053 COMPREHEN METABOLIC PANEL: CPT

## 2019-06-28 PROCEDURE — S0028 INJECTION, FAMOTIDINE, 20 MG: HCPCS

## 2019-06-28 PROCEDURE — C1769 GUIDE WIRE: HCPCS

## 2019-06-28 PROCEDURE — 93655 ICAR CATH ABLTJ DSCRT ARRHYT: CPT

## 2019-06-28 PROCEDURE — 93653 COMPRE EP EVAL TX SVT: CPT

## 2019-06-28 NOTE — RAD
PORTABLE CHEST:

 

Date:  06/28/19 

 

HISTORY:  

Chest pain. 

 

FINDINGS:

Heart size appears slightly enlarged. Postop sternotomy changes are seen. Lungs are clear of any infi
ltrative process. 

 

IMPRESSION: 

Minimal cardiomegaly. 

 

 

POS: [facility]

## 2019-06-28 NOTE — CT
CT arteriogram chest with IV contrast and 3-D imaging



HISTORY: Chest pain. Dyspnea.



FINDINGS: There is good contrast opacification of the pulmonary arteries and thoracic aorta with norm
al origin of the great vessels at the aortic arch. Arterial calcification is present within the

chest and abdomen. Postoperative changes consistent with prior CABG. No pleural fluid, pneumothorax, 
or mediastinal adenopathy. Tiny nonspecific subpleural nodule noted within the left lower lobe.







IMPRESSION: No CT evidence of pulmonary embolus.



Atherosclerosis.



Reported By: JB Mcknight 

Electronically Signed:  6/28/2019 12:19 PM

## 2019-06-28 NOTE — HP
CHIEF COMPLAINT:  Tachycardia and bradycardia.



HISTORY OF PRESENT ILLNESS:  Mr. Ratliff is a very pleasant 64-year-old white

gentleman, who comes to the hospital for palpitations.  He was seen in the ER and

was found to have episodes where his heart rate which is dipped to the 40s.  He

would feel tired and fatigued at that time and it would just pick back up to the 60s

and then he was being discharged for followup, and before he got discharged, he had

a small run of what appeared to be rapid AFib, heart rate in the 140s and he felt

lightheaded, presyncopal, and so he was kept over.  Cardiology was consulted.  On my

evaluation, Mr. Ratliff has these runs of AFib, and on the monitor, he continues to

have dips in his heart rates down to the 40s and he becomes lightheaded and

symptomatic with them.  They were very brief, they last about 5-10 seconds, and then

they get better.  He has not passed out, but has felt close to passing out. 



He was admitted originally on 05/29, and had a stress test that was abnormal.  Heart

catheterization showed multivessel disease and he actually underwent coronary artery

bypass grafting by Dr. Fernández.  He had a PAULA to the LAD, vein graft to a diagonal,

and a vein graft to an OM.  He did well postoperatively, but he did not have any

atrial fibrillation postoperatively.  He was discharged home.  He did try to go to

cardiac rehab, but this was not something that he liked as he could do a lot more

than what is offered.  So, he started doing his physical therapy at home. 



Currently, Mr. Ratliff has no chest pain, tightness, or pressure and no shortness of

breath. 



PAST MEDICAL HISTORY:  

1. Coronary artery disease.

2. Hyperlipidemia.

3. Hypertension.

4. Type 2 diabetes.



SURGICAL HISTORY:  

1. Multiple stents placed in the past.

2. Tonsillectomy.

3. CABG x3 as above.



SOCIAL HISTORY:  No tobacco.  No drugs.  Social alcohol use.



MEDICATIONS:  Outpatient medications from recent discharge include:

1. Lysine 1000 mg a day.

2. Famotidine.

3. Omega-3 fish oil.

4. CoQ10.

5. Glyburide 10 mg b.i.d.

6. Lisinopril 5 mg a day.

7. Metformin 1000 mg b.i.d.

8. Atorvastatin 40 mg nightly. 

He has not been taking the beta-blocker.



ALLERGIES:  

1. DULAGLUTIDE. 

2. HYDROCODONE.



REVIEW OF SYSTEMS:  A 12-point review of systems was done and was all negative

unless stated in the history of present illness. 



PHYSICAL EXAMINATION:

VITAL SIGNS:  Temperature 98.2, pulse 69, respiratory rate 18, saturation 99% on

room air, and blood pressure 129/70. 

GENERAL:  Awake, alert, and oriented x3, in no distress. 

HEENT:  Normocephalic and atraumatic. 

NECK:  Supple. 

LUNGS:  Clear. 

CARDIOVASCULAR:  S1 and S2.  No S3 or S4.  No murmurs.  No gallops. 

ABDOMEN:  Soft.  Positive bowel sounds. 

EXTREMITIES:  Trace edema. 

SKIN:  Warm and dry.



LABORATORY DATA:  Laboratory work was reviewed.  CBC with white count of 5.4,

hemoglobin of 13, hematocrit of 37, and platelet count 209.  Chemistries

unremarkable.  Troponin is 0.01 and 0.05.  Lipase was 41. 



CT angio of the chest showed no evidence of pulmonary embolism. 



Chest x-ray was unremarkable.



ASSESSMENT AND PLAN:  

1. Tachybrady syndrome.

2. Likely postoperative atrial fibrillation, very symptomatic.

3. Symptomatic sinus bradycardia.



PLAN:  

1. At this point, he has a Class I recommendation for pacemaker.  We will plan on

keeping him in the hospital.  He has eaten today, so we cannot do it today.  We will

plan on doing this on Monday. 

2. Once his pacemaker is in place, we will plan on starting amiodarone at that time

for his postoperative AFib. 

3. We will continue all his home medications.

4. PPI for stress ulcer prophylaxis and Lovenox subcu for DVT prophylaxis.

5. Full code.

6. Disposition, pending placement of pacemaker.







Job ID:  [id number]

## 2019-06-29 LAB
ANION GAP SERPL CALC-SCNC: 14 MMOL/L (ref 10–20)
BASOPHILS # BLD AUTO: 0 THOU/UL (ref 0–0.2)
BASOPHILS NFR BLD AUTO: 0.6 % (ref 0–1)
BUN SERPL-MCNC: 14 MG/DL (ref 8.4–25.7)
CALCIUM SERPL-MCNC: 9.3 MG/DL (ref 7.8–10.44)
CHLORIDE SERPL-SCNC: 104 MMOL/L (ref 98–107)
CO2 SERPL-SCNC: 24 MMOL/L (ref 23–31)
CREAT CL PREDICTED SERPL C-G-VRATE: 108 ML/MIN (ref 70–130)
EOSINOPHIL # BLD AUTO: 0.5 THOU/UL (ref 0–0.7)
EOSINOPHIL NFR BLD AUTO: 9 % (ref 0–10)
GLUCOSE SERPL-MCNC: 90 MG/DL (ref 80–115)
HGB BLD-MCNC: 12.7 G/DL (ref 14–18)
LYMPHOCYTES # BLD: 2 THOU/UL (ref 1.2–3.4)
LYMPHOCYTES NFR BLD AUTO: 34.4 % (ref 21–51)
MCH RBC QN AUTO: 32.1 PG (ref 27–31)
MCV RBC AUTO: 92.6 FL (ref 78–98)
MONOCYTES # BLD AUTO: 0.4 THOU/UL (ref 0.11–0.59)
MONOCYTES NFR BLD AUTO: 6.7 % (ref 0–10)
NEUTROPHILS # BLD AUTO: 2.9 THOU/UL (ref 1.4–6.5)
NEUTROPHILS NFR BLD AUTO: 49.3 % (ref 42–75)
PLATELET # BLD AUTO: 206 THOU/UL (ref 130–400)
POTASSIUM SERPL-SCNC: 3.8 MMOL/L (ref 3.5–5.1)
RBC # BLD AUTO: 3.95 MILL/UL (ref 4.7–6.1)
SODIUM SERPL-SCNC: 138 MMOL/L (ref 136–145)
WBC # BLD AUTO: 5.8 THOU/UL (ref 4.8–10.8)

## 2019-06-29 RX ADMIN — Medication SCH ML: at 20:28

## 2019-06-29 NOTE — PDOC.CTH
Cardiology Progress Note





- Subjective





He had na episode this morning were his HR went up into the 200's, very 

symptomatic and was started on amiodarone. He then started to have several lows 

down to the 40's also very symptomatic with lightheadedness and presyncope so 

amiodarone had to be stopped. 





- Objective


 Vital Signs











  Temp Temp Pulse Pulse Pulse Pulse Pulse


 


 06/29/19 15:17  97.7 F      


 


 06/29/19 11:14  98.4 F      


 


 06/29/19 10:43    66    


 


 06/29/19 09:45       


 


 06/29/19 08:51   97.9 F   203 H  193 H  187 H  89


 


 06/29/19 07:46  97.9 F   66    














  Pulse Pulse Resp Resp Resp Resp Resp


 


 06/29/19 15:17       


 


 06/29/19 11:14       


 


 06/29/19 10:43       


 


 06/29/19 09:45       


 


 06/29/19 08:51  79  85   26 H  26 H  22 H  22 H


 


 06/29/19 07:46    18    














  Resp Resp BP BP BP BP BP


 


 06/29/19 15:17       


 


 06/29/19 11:14       


 


 06/29/19 10:43       


 


 06/29/19 09:45       


 


 06/29/19 08:51  20  18  133/100 H  128/56 L  187/91 H  187/98 H  170/84 H


 


 06/29/19 07:46       














  BP BP Pulse Ox Pulse Ox Pulse Ox Pulse Ox Pulse Ox


 


 06/29/19 15:17       


 


 06/29/19 11:14       


 


 06/29/19 10:43       


 


 06/29/19 09:45    100    


 


 06/29/19 08:51  165/81 H    99  100  100  99


 


 06/29/19 07:46   162/83 H  98    








 











Weight                         196 lb 3.2 oz














 











 06/28/19 06/29/19 06/30/19





 06:59 06:59 06:59


 


Intake Total  1170 


 


Output Total  1845 


 


Balance  -675 














- Physical Examination


General/Neuro: alert & oriented x3, NAD


Neck: no JVD present


Lungs: unlabored respirations


Heart: RRR


Abdomen: NT/ND


Extremities: other: (no edema)





- Telemetry


Telemetry Rhythm: NSR, SVT, S Juan





- Labs


Result Diagrams: 


 06/29/19 04:46





 06/29/19 04:46


 Troponin/CKMB











Troponin I  0.037 ng/mL (< 0.028)  H  06/28/19  17:35    














- Assessment/Plan





1. Tachy/Juan syndrome


2. SVT


3. Paroxysmal afib


4. Recent CABG 1 month ago





PLAN:


- I spoke with Dr. Vázquez and she will come in to do PPM tomorrow. morning. 


- I spoke with Mr. Ratliff and he agrees to proceed.

## 2019-06-30 PROCEDURE — 02H63JZ INSERTION OF PACEMAKER LEAD INTO RIGHT ATRIUM, PERCUTANEOUS APPROACH: ICD-10-PCS

## 2019-06-30 PROCEDURE — 02HK3JZ INSERTION OF PACEMAKER LEAD INTO RIGHT VENTRICLE, PERCUTANEOUS APPROACH: ICD-10-PCS

## 2019-06-30 PROCEDURE — 0JH606Z INSERTION OF PACEMAKER, DUAL CHAMBER INTO CHEST SUBCUTANEOUS TISSUE AND FASCIA, OPEN APPROACH: ICD-10-PCS

## 2019-06-30 RX ADMIN — Medication SCH ML: at 10:33

## 2019-06-30 RX ADMIN — Medication SCH ML: at 20:38

## 2019-06-30 NOTE — PDOC.CTH
Cardiology Progress Note





- Subjective





He had his PPM this morning and it went well. 





- Objective


 Vital Signs











  Temp Pulse Ox


 


 06/30/19 11:03  98.2 F 


 


 06/30/19 07:29   100


 


 06/30/19 07:23  98.1 F 


 


 06/30/19 04:00  97.6 F 


 


 06/30/19 00:00  97.9 F 








 











Weight                         196 lb 3.2 oz














 











 06/29/19 06/30/19 07/01/19





 06:59 06:59 06:59


 


Intake Total 1170 2920 


 


Output Total 1845 2050 


 


Balance -675 870 














- Physical Examination


General/Neuro: alert & oriented x3, NAD


Neck: no JVD present


Lungs: unlabored respirations


Heart: RRR


Abdomen: NT/ND


Extremities: other: (no edema)





- Telemetry


Telemetry Rhythm: NSR





- Labs


Result Diagrams: 


 06/29/19 04:46





 06/29/19 04:46


 Troponin/CKMB











Troponin I  0.037 ng/mL (< 0.028)  H  06/28/19  17:35    














- Assessment/Plan





1. Tachy/Juan syndrome


2. SVT


3. Paroxysmal afib


4. Recent CABG 1 month ago


5. S/P PPM placement. 


6. HTN





PLAN:


- Amiodarone load with 400 mg BID for 10 days then 200 mg daily


- EP consultation if he recurs.


- Monitor in IMCU for now. 


- If he recurs will keep here for inpt EP consult for possible SVT ablation.

## 2019-06-30 NOTE — RAD
XR Chest 1 View Portable



HISTORY: Pacemaker placement



COMPARISON: 6/28/2019 study.



FINDINGS: Heart size appears slightly enlarged with postop sternotomy change. There is been interval 
placement of a pacemaker. No signs of pneumothorax. The lungs are clear of infiltrates.



IMPRESSION: Pacemaker placement. No signs of pneumothorax.



Reported By: Yasmani Yin 

Electronically Signed:  6/30/2019 10:27 AM

## 2019-06-30 NOTE — CON
DATE OF CONSULTATION:  



HISTORY OF PRESENT ILLNESS:  Trevon Ratliff is a 64-year-old gentleman, status

post recent CABG, who was admitted to the hospital with palpitation.  His heart rate

was 200, very symptomatic, started on amiodarone.  It dropped him down to the 40s,

very symptomatic, lightheaded, dizziness.  Apparently, tachy-niko syndrome, SVT,

paroxysmal atrial fibrillation, recent CABG.  He had a pacemaker inserted in the

MICU, is the reason for consult. 



He had a CT chest angiogram done, which showed no evidence of pulmonary emboli.  On

admission to the ER, his chest x-ray also showed recent CABG, but no acute

infiltrates. 



The patient is a nonsmoker.  No prior history of pneumonia or TB.  This morning, he

is complaining of significant pain in the sciatic. 



PAST MEDICAL HISTORY:  Hypertension, diabetes, lipidemia, __________ disease.



PAST SURGICAL HISTORY:  Multiple stents, tonsils, CABG.



HABITS:  Tobacco, none.  Allergies, none.



HOME MEDICATIONS:  Includes,

1. Metformin 1000 b.i.d.

2. Glyburide 10 twice a day.

3. Lisinopril 5 a day.

4. Pepcid 20.

5. Atorvastatin 60.



ALLERGIES:  HYDROCODONE.



REVIEW OF SYSTEMS:  Full review of systems otherwise 10-point negative.



PHYSICAL EXAMINATION:

VITAL SIGNS:  His saturations are 100% room air, temperature 98, blood pressure

154/85. 

CHEST:  No wheezing or crackles. 

CARDIAC:  Normal S1 and S2.  No gallops. 

ABDOMEN:  No masses.



IMPRESSION:  

1. Sick sinus syndrome, status post pacemaker.

2. Recent coronary artery bypass graft.

3. Sciatica pain. 



I am going to give him some gabapentin for his pain.  Otherwise, continue supportive

care.  Disposition as per Cardiology. 







Job ID:  [id number]

## 2019-07-01 VITALS — SYSTOLIC BLOOD PRESSURE: 142 MMHG | DIASTOLIC BLOOD PRESSURE: 85 MMHG

## 2019-07-01 RX ADMIN — Medication SCH ML: at 19:28

## 2019-07-01 RX ADMIN — Medication SCH ML: at 09:34

## 2019-07-01 NOTE — PRG
DATE OF SERVICE:  07/01/2019



SUBJECTIVE:  This morning, he is awake, alert, responsive, no shortness of breath,

less pain. 



OBJECTIVE:  VITAL SIGNS:  Saturations 90% on room air, blood pressure 153/86,

respirations 18. 

CHEST:  No wheezing or crackles. 

CARDIAC:  Normal S1 and S2.  No gallops. 

ABDOMEN:  No masses.



IMPRESSION:  Status post supraventricular tachycardia, sick sinus syndrome, status

post pacemaker, recent coronary artery bypass graft, sciatica. 



PLAN:  Continue supportive care.  Hopefully, home as per Cardiology.







Job ID:  [id number]

## 2019-07-01 NOTE — PDOC.CTH
Cardiology Progress Note





- Subjective





He had an episode of very symptomatic SVT this morning. He had a repeat episode 

this afternoon that required to get IV adenosine to break it. 





- Objective


 Vital Signs











  Temp Pulse Pulse BP BP Pulse Ox


 


 07/01/19 15:36  97.8 F     


 


 07/01/19 11:15  98.6 F     


 


 07/01/19 09:32   96  84  142/85 H  141/80 H 


 


 07/01/19 07:53       99


 


 07/01/19 07:16  98.3 F     








 











Weight                         196 lb 3.2 oz














 











 06/30/19 07/01/19 07/02/19





 06:59 06:59 06:59


 


Intake Total 2920 900 


 


Output Total 2050 725 


 


Balance 870 175 














- Physical Examination


General/Neuro: alert & oriented x3, NAD


Neck: no JVD present


Lungs: CTA, unlabored respirations


Heart: RRR


Abdomen: NT/ND


Extremities: other: (no edema)





- Telemetry


Telemetry Rhythm: NSR, SVT





- Labs


Result Diagrams: 


 06/29/19 04:46





 06/29/19 04:46


 Troponin/CKMB











Troponin I  0.037 ng/mL (< 0.028)  H  06/28/19  17:35    














- Assessment/Plan








1. Tachy/Juan syndrome


2. SVT


3. Paroxysmal afib


4. Recent CABG 1 month ago


5. S/P PPM placement. 


6. HTN





PLAN:


- Amiodarone load with 400 mg BID for 9 days then 200 mg daily


- EP consultation for SVT.


- 30 minutes critical care.

## 2019-07-01 NOTE — CON
DATE OF CONSULTATION:  07/01/2019



HISTORY OF PRESENT ILLNESS:  I am seeing Mr. Ratliff at our [facility]

Step-down ICU as an electrophysiology consultant. 



His problems are: 



1. Recurrent arrhythmias.  Wide-complex tachycardia, also narrow complex 
tachycardia

with rapid rates up to 200 beats per minute, noted on monitor. 

2. Episodic atrial flutter with variable AV conductions seen.  

3. Had  susteined SVT noted this afternoon at cycle lengths 360 milliseconds.  

4. Tachy-niko syndrome, sick sinus syndrome, prompting a dual-chamber pacemaker

implantation on 06/28/2019 with a [employer] device. 

5. Nonsustained atrial fibrillation post operatively.

6. Coronary artery disease, multivessel, status post coronary bypass grafting

surgery by Dr. Fernández with PAULA to LAD, SVG to diagonal and OM. 

7. Risk factors including hypertension, hyperlipidemia, type 2 diabetes.



ALLERGIES:  DULOXETINE, HYDROCODONE.



MEDICATIONS:  At home include;

1. Lysine.

2. Famotidine.

3. Omega-3 fish oil supplements. 

4. Coenzyme Q10.

5. Glyburide.

6. Lisinopril.

7. Metformin.

8. Lipitor.



SUBJECTIVE:  Mr. Ratliff was admitted after episodes of palpitations.  He was 
noted

to have marked bradycardia with heart rates in 40s in the ER, has felt tired and

fatigued, but then later developed atrial fibrillation with very rapid rates as 
per

note.  The episode was regular.  Later on the monitor he did develop 
nonsustained

atrial flutter episodes, but also very rapid narrow complex SVT, which 
progressed

into wide-complex tachycardia at same rate at cycle lengths about 280 
milliseconds.

He states they were slower, but still very rapid tachyarrhythmias.  The most 
recent

of them __________.  He continues to take amiodarone initiated on the 30th of June.

Denies dizziness, or loss of consciousness.  No stroke-like symptoms, 
neurological

deficits, no fever, chills, cough.  No PND or orthopnea.  Rest of 12-point 
system

otherwise unremarkable. 



OBJECTIVE:  VITAL SIGNS:  Blood pressure is 139/79, heart rate 74, respiratory 
rate

17, the patient is afebrile. 

GENERAL:  He is alert, oriented man, in no apparent distress. 

NECK:  Supple.  Jugular veins not distended. 

CHEST:  Coarse with crackles. HEART:  Sounds are regular to rate and rhythm.  No

murmur or gallop. 

ABDOMEN:  Benign.  Bowel sounds positive. 

Extremities:  Lower extremities without edema, clubbing, or cyanosis.  Pulses 
are

adequate. 

NEUROLOGIC:  The patient is nonfocal. 

MUSCULOSKELETAL:  Without joint swelling or deformities. 

SKIN:  Without rash.  Midsternal scar and left subclavian pacemaker insertion 
sites

are healing adequately. 



DATABASE:  EKG is reviewed.  Initial EKG reveals sinus rhythm, rate of 64 beats 
per

minute.  The telemetry strips reveal atrial flutter with variable AV conduction.

Also what seems like 1:1 AV tachycardia also noted.  The most recent episode 
clearly

demonstrates narrow complex SVT at a rate of 180 beats per minute with PVF 
seems to

be following closely the QRS. 



ASSESSMENT AND PLAN:  Mr. Ratliff is a pleasant 64-year-old man with prior 
history of

recurrent arrhythmias after recent bypass surgery.  He may have short atrial

fibrillation, but also had atrial flutter episodes documented, had significant

bradycardia for which he underwent dual-chamber pacemaker implantation on the 
28th.

Despite his tachyarrhythmic episodes continues, he was started on amiodarone on 
the

30th, but still had an episode of supraventricular tachycardia, which though

terminated with adenosine. 



We discussed a variety of arrhythmias he had including possible atrial 
fibrillation,

atrial flutter and also a possible supraventricular tachycardia, has had 
aberrant

conduction with supraventricular tachycardia or atrial flutter noted.  He could 
have

variety of atrial arrhythmias circuits, slow pathway modification or 
cavotricuspid

isthmus ablation may be of help.  We also discussed the potential future need 
for

pulmonary venous isolation procedure, but I would hold off on that.  Hence, the

recent bypass surgery to allow for some recovery. 



He may or may not need amiodarone for suppression in the interim. 



At this point, we will continue rhythm suppression with initiated amiodarone, if

necessary IV adenosine seems to be also helpful to terminate the arrhythmia.  We

discussed the procedure of EP study and ablation and the risks benefits 
associated

with that including chance of infection, bleeding, recurrence, bradycardia and

subsequent pacemaker dependency. 



We will schedule him a near date.







Job ID:  [id number]



[facility]

## 2019-07-02 RX ADMIN — Medication SCH ML: at 09:24

## 2019-07-02 RX ADMIN — Medication SCH ML: at 20:43

## 2019-07-02 NOTE — PDOC.CTH
Cardiology Progress Note





- Subjective





EP PROGRESS NOTE: 7/2/19





Seen as follow up for SVT and atrial arrhythmias.  Feels fair today. No cardiac 

complaints today





- Objective


 Vital Signs











  Temp Pulse Ox


 


 07/02/19 10:30  98.5 F 


 


 07/02/19 08:00   99


 


 07/02/19 07:12  99.0 F 


 


 07/02/19 03:50  98.9 F 








 











Weight                         192 lb 1 oz














 











 07/01/19 07/02/19 07/03/19





 06:59 06:59 06:59


 


Intake Total 900 2400 


 


Output Total 725 600 


 


Balance 175 1800 














- Physical Examination


General/Neuro: alert & oriented x3, NAD


Neck: carotid US brisk, no JVD present


Lungs: CTA, unlabored respirations


Heart: PMI normal, RRR


Abdomen: NT/ND, soft





- Telemetry


Telemetry Rhythm: SR





- Labs


Result Diagrams: 


 06/29/19 04:46





 06/29/19 04:46


 Troponin/CKMB











Troponin I  0.037 ng/mL (< 0.028)  H  06/28/19  17:35    














- Assessment/Plan


1. Recurrent arrhythmias.  Wide-complex tachycardia, also narrow complex 

tachycardia


with rapid rates up to 200 beats per minute, noted on monitor. 


2. Episodic atrial flutter with variable AV conductions seen.  


3. SVT noted 7/1.  cycle length 360 milliseconds.  


4. Tachy-niko syndrome, sick sinus syndrome, 


   -prompting a dual-chamber pacemaker implantation on 06/28/2019 with a 

[employer] device. Device interrogation reviewed. Normal operation


5. Nonsustained atrial fibrillation post operatively.


6. Coronary artery disease, multivessel, status post coronary bypass grafting


surgery by Dr. Fernández with PAULA to LAD, SVG to diagonal and OM. 


7. CAD Risk factors including hypertension, hyperlipidemia, type 2 diabetes.








NPO after MN for EPS and possible ablation for SVT and right atrial flutter 

tomorrow.  He understands the potential risk for PPM lead dislodgment that can 

occur with EPS/ RFA following a recently implanted PPM. Discussed R/B/A and pt 

wished to proceed.

## 2019-07-02 NOTE — PDOC.CTH
Cardiology Progress Note





- Subjective





Doing well. No recurrence. Walking round without issues. 





- Objective


 Vital Signs











  Temp Pulse Ox


 


 07/02/19 15:10  98.2 F 


 


 07/02/19 10:30  98.5 F 


 


 07/02/19 08:00   99


 


 07/02/19 07:12  99.0 F 








 











Weight                         192 lb 1 oz














 











 07/01/19 07/02/19 07/03/19





 06:59 06:59 06:59


 


Intake Total 900 2400 


 


Output Total 725 600 


 


Balance 175 1800 














- Physical Examination


General/Neuro: alert & oriented x3, NAD


Neck: no JVD present


Lungs: CTA, unlabored respirations


Heart: RRR


Abdomen: NT/ND


Extremities: other: (no edema)





- Telemetry


Telemetry Rhythm: NSR





- Labs


Result Diagrams: 


 06/29/19 04:46





 06/29/19 04:46


 Troponin/CKMB











Troponin I  0.037 ng/mL (< 0.028)  H  06/28/19  17:35    














- Assessment/Plan





1. Tachy/Juan syndrome


2. SVT


3. Paroxysmal afib, post op.


4. Atrial flutter with variable AV block.


5. Recent CABG 1 month ago


6. S/P PPM placement. 


7. HTN





PLAN:


- For EP study and likely SVT and flutter ablation tomorrow. 


- Will need full anticoagulation on discharge.

## 2019-07-02 NOTE — CCL
Date of procedure:

6/30/19

 

INDICATIONS FOR PROCEDURE:

64-year-old gentleman who is status post bypass surgery approximately one month 
ago who has developed tachy/niko syndrome with significant pauses and  
intermittent atrial fibrillation. He was advised to undergo dual chamber 
pacemaker insertion with atrial therapies. 

 

He was taken to the cardiac cath lab where he underwent the procedure today 
without difficulties or complications. He was implanted with a dual chamber 
pacemaker from [employer] which is an MRI compatible device with also atrial 
therapies. An [employer]visa dual chamber pacemaker with two screw-in leads. One in the 
atrium and one in the ventricle. There were no difficulties or complications 
encountered. Pacemaker was set with the upper rate at 130 and the lower rate 
was set at 60. Note, the patient was given 2 mg of IV versed for the procedure 
and had a total sedation time of 28 minutes. Throughout the procedure he was 
monitored by an independent observer present for heart rate, blood pressure and 
O2 saturations, all of which remain stable throughout the procedure. 

OWEN

## 2019-07-02 NOTE — PRG
DATE OF SERVICE:  07/02/2019



SUBJECTIVE:  Trevon Ratliff, this morning, is doing better.  No more pain.  No

shortness of breath. 



OBJECTIVE:  VITAL SIGNS:  Saturations are 98% on room air, temperature 99, blood

pressure _120\72 and respirations 18. 

CHEST:  No wheezing. 

CARDIAC:  Normal S1 and S2.  No gallops. 

ABDOMEN:  No masses.



IMPRESSION:  

1. Status post coronary artery bypass grafting.

2. Status post supraventricular tachycardia.

3. Status post pacemaker.



PLAN:  The patient is scheduled for EP ablation study tomorrow. 



Disposition as per Cardiology.  Pulmonary will follow while in the MICU.







Job ID:  [id number]



MTDD

## 2019-07-03 VITALS — TEMPERATURE: 97.8 F

## 2019-07-03 LAB
ANION GAP SERPL CALC-SCNC: 15 MMOL/L (ref 10–20)
BASOPHILS # BLD AUTO: 0 THOU/UL (ref 0–0.2)
BASOPHILS NFR BLD AUTO: 0.8 % (ref 0–1)
BUN SERPL-MCNC: 14 MG/DL (ref 8.4–25.7)
CALCIUM SERPL-MCNC: 9.7 MG/DL (ref 7.8–10.44)
CHLORIDE SERPL-SCNC: 102 MMOL/L (ref 98–107)
CO2 SERPL-SCNC: 21 MMOL/L (ref 23–31)
CREAT CL PREDICTED SERPL C-G-VRATE: 89 ML/MIN (ref 70–130)
EOSINOPHIL # BLD AUTO: 0.8 THOU/UL (ref 0–0.7)
EOSINOPHIL NFR BLD AUTO: 13.1 % (ref 0–10)
GLUCOSE SERPL-MCNC: 225 MG/DL (ref 80–115)
HGB BLD-MCNC: 13.9 G/DL (ref 14–18)
LYMPHOCYTES # BLD: 1.9 THOU/UL (ref 1.2–3.4)
LYMPHOCYTES NFR BLD AUTO: 32.1 % (ref 21–51)
MCH RBC QN AUTO: 31.4 PG (ref 27–31)
MCV RBC AUTO: 91.5 FL (ref 78–98)
MONOCYTES # BLD AUTO: 0.4 THOU/UL (ref 0.11–0.59)
MONOCYTES NFR BLD AUTO: 6.8 % (ref 0–10)
NEUTROPHILS # BLD AUTO: 2.7 THOU/UL (ref 1.4–6.5)
NEUTROPHILS NFR BLD AUTO: 47.2 % (ref 42–75)
PLATELET # BLD AUTO: 173 THOU/UL (ref 130–400)
POTASSIUM SERPL-SCNC: 4.1 MMOL/L (ref 3.5–5.1)
RBC # BLD AUTO: 4.42 MILL/UL (ref 4.7–6.1)
SODIUM SERPL-SCNC: 134 MMOL/L (ref 136–145)
WBC # BLD AUTO: 5.8 THOU/UL (ref 4.8–10.8)

## 2019-07-03 PROCEDURE — 4A0234Z MEASUREMENT OF CARDIAC ELECTRICAL ACTIVITY, PERCUTANEOUS APPROACH: ICD-10-PCS | Performed by: INTERNAL MEDICINE

## 2019-07-03 PROCEDURE — 02583ZZ DESTRUCTION OF CONDUCTION MECHANISM, PERCUTANEOUS APPROACH: ICD-10-PCS | Performed by: INTERNAL MEDICINE

## 2019-07-03 PROCEDURE — 4A023FZ MEASUREMENT OF CARDIAC RHYTHM, PERCUTANEOUS APPROACH: ICD-10-PCS | Performed by: INTERNAL MEDICINE

## 2019-07-03 RX ADMIN — Medication SCH: at 14:52

## 2019-07-03 NOTE — PRG
DATE OF SERVICE:  07/03/2019



SUBJECTIVE:  Trevon Ratliff, this morning, in no pain, no shortness of breath.



OBJECTIVE:  VITAL SIGNS:  Saturations are 98% on room air, temperature 98, and blood

pressure 156/62. 

GENERAL:  No chest pain.  No shortness of breath.  No back pain. 

CHEST:  Decreased breath sounds.  No wheezing. 

CARDIAC:  Normal S1 and S2.  No gallops. 

ABDOMEN:  No masses.



LABORATORY DATA:  Labs are unremarkable.



IMPRESSION:  Sick sinus, status post supraventricular tachycardia.



PLAN:  He is going for ablation today.  Continue pain relief.  Disposition as per

Cardiology. 







Job ID:  [id number]

## 2019-07-03 NOTE — OP
DATE OF PROCEDURE:  07/03/2019



PROCEDURES PERFORMED:  Electrophysiology study and radiofrequency ablation.



REASON FOR PROCEDURE:  Mr. Ratliff is a 64-year-old man with prior history of

coronary artery disease, recent coronary artery bypass grafting surgery, postop

atrial fibrillation on transient amiodarone therapy despite of recently 
initiated

amiodarone, the patient had both atrial flutter and AVNRT, which required 
adenosine

for termination.  He is here for EP study and possible ablation. 



DESCRIPTION OF PROCEDURE:  The patient received propofol by Anesthesia 
specialist.

After adequate level of sedation achieved, the left and right femoral venous 
areas

were prepped, draped, and anesthetized using subcutaneous lidocaine.  On the 
left

side, a 6 and 8-[place] sheaths were introduced through which a octapolar and

decapolar catheter was advanced to the right atrium, right ventricle, His bundle
,

and also the CS position.  Pacing mapping and recording were performed each

location, following findings were noted. 



Baseline rhythm was sinus rhythm at 789 milliseconds RR interval, AZ of 105, 
QRS 56,

, AH 84, and HV 58 milliseconds.  The AV Wenckebach cycle length was 360

milliseconds.  Retrograde Wenckebach cycle length was also 360 milliseconds.

Concentric retrograde VA conduction was seen.  AV alyssia ERP was measured to be

600/260 milliseconds.  No definite jump was observed with this maneuver.  On the

other hand, very long slow pathway conduction was noted.  Ventricular access to 
my

testing was also performed with 400 milliseconds drive train decrementing up to 
3

ventricular extrastimuli to the refractory.  Ventricular ERP was 400/220/180/180

milliseconds.  With burst atrial pacing, we were able to induce a 1:1 
tachycardia,

to be typical for AV alyssia reentrant tachycardia with very short VA conduction.
  The

ventricular overdrive pacing did not entrain, but terminated tachycardia.  This 
was

during Isuprel administration.  Also off Isuprel, atrial flutter was inducible 
with

flutter cycle length of 230 milliseconds with clear VA dissociation - 220 AV

conduction. 



At this point, the right femoral vein was accessed using ultrasound guidance 
and an

8-[place] short sheath was introduced through which a Thermo[employer] SFST catheter 
was

advanced to the right atrium.  3D map of the right atrium was performed with 
careful

attention to avoid moving the recently implanted pacemaker leads.  The His 
bundle

and CS positions were clearly delineated.  Cavotricuspid isthmus ablation was

performed during proximal CS pacing.  We were able to prolong the transisthmus 
time

for 40 milliseconds to 150 milliseconds.  Longest transisthmus time was measured

adjacent to the ablation not suggestive of transisthmus block.  Following that, 
slow

pathway modification was also performed reducing the power to 20, then 30 malave.

Junctional beats were observed during the ablation and we were able to achieve 
no

inducibility of the tachycardia.  On Isuprel during the testing, neither the 
atrial

flutter or the AVNRT was inducible.  The post ablation AV Wenckebach cycle 
length

was changed before Isuprel at 420 milliseconds.  AV ERP was changed to 600/290

milliseconds.  At the end of the case, cardiac silhouette did not significantly

changed, suggest no evidence of pericardial effusion.  The pacemaker lead was

interrogated and adequate function was ascertained. 

Catheter was removed in the cath lab.  Sheaths were pulled and manual pressure 
was

applied.  The patient tolerated the procedure.  No complications noted. 



CONCLUSION:  

1. 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 
which

appears to be typical isthmus dependent in morphology. 

2. Successful slow pathway modification and cavotricuspid isthmus ablation

performed, eliminating inducibility of these arrhythmias. 

3. Normal HV interval pre and post ablation.

4. No evidence of accessory pathway.

5. No inducible ventricular arrhythmia with up to 4 ventricular extrastimuli.

6. Adequate pacemaker function.



PLAN:  

1. Continue monitoring for recurrent arrhythmia.

2. Consider short-term amiodarone if atrial fibrillation is seen, but at this 
point,

continue tapering it off. 







Job ID:  [id number]



[facility]